# Patient Record
Sex: FEMALE | Race: WHITE | NOT HISPANIC OR LATINO | Employment: OTHER | URBAN - METROPOLITAN AREA
[De-identification: names, ages, dates, MRNs, and addresses within clinical notes are randomized per-mention and may not be internally consistent; named-entity substitution may affect disease eponyms.]

---

## 2018-08-08 ENCOUNTER — TRANSFERRED RECORDS (OUTPATIENT)
Dept: HEALTH INFORMATION MANAGEMENT | Facility: CLINIC | Age: 83
End: 2018-08-08
Payer: MEDICARE

## 2018-10-24 LAB
CHOLESTEROL (EXTERNAL): 206 MG/DL (ref 70–199)
HDLC SERPL-MCNC: 60 MG/DL (ref 40–90)
LDL CHOLESTEROL CALCULATED (EXTERNAL): 130 MG/DL (ref 50–129)
NON HDL CHOLESTEROL (EXTERNAL): 146 MG/DL (ref 70–159)
TRIGLYCERIDES (EXTERNAL): 78 MG/DL (ref 30–149)

## 2019-03-05 ENCOUNTER — TRANSFERRED RECORDS (OUTPATIENT)
Dept: HEALTH INFORMATION MANAGEMENT | Facility: CLINIC | Age: 84
End: 2019-03-05
Payer: MEDICARE

## 2019-03-26 ENCOUNTER — TRANSFERRED RECORDS (OUTPATIENT)
Dept: HEALTH INFORMATION MANAGEMENT | Facility: CLINIC | Age: 84
End: 2019-03-26
Payer: MEDICARE

## 2019-08-17 ENCOUNTER — TRANSFERRED RECORDS (OUTPATIENT)
Dept: HEALTH INFORMATION MANAGEMENT | Facility: CLINIC | Age: 84
End: 2019-08-17
Payer: MEDICARE

## 2020-01-07 ENCOUNTER — TRANSFERRED RECORDS (OUTPATIENT)
Dept: HEALTH INFORMATION MANAGEMENT | Facility: CLINIC | Age: 85
End: 2020-01-07
Payer: MEDICARE

## 2020-03-02 ENCOUNTER — TRANSFERRED RECORDS (OUTPATIENT)
Dept: HEALTH INFORMATION MANAGEMENT | Facility: CLINIC | Age: 85
End: 2020-03-02
Payer: MEDICARE

## 2020-08-13 LAB
CHOLESTEROL (EXTERNAL): 158 MG/DL (ref 70–199)
HDLC SERPL-MCNC: 56 MG/DL (ref 40–90)
LDL CHOLESTEROL CALCULATED (EXTERNAL): 87 MG/DL (ref 50–129)
NON HDL CHOLESTEROL (EXTERNAL): 102 MG/DL (ref 70–159)
TRIGLYCERIDES (EXTERNAL): 77 MG/DL (ref 0–149)

## 2020-10-16 ENCOUNTER — TRANSFERRED RECORDS (OUTPATIENT)
Dept: HEALTH INFORMATION MANAGEMENT | Facility: CLINIC | Age: 85
End: 2020-10-16
Payer: MEDICARE

## 2020-11-09 ENCOUNTER — TRANSFERRED RECORDS (OUTPATIENT)
Dept: HEALTH INFORMATION MANAGEMENT | Facility: CLINIC | Age: 85
End: 2020-11-09
Payer: MEDICARE

## 2020-11-21 ENCOUNTER — TRANSFERRED RECORDS (OUTPATIENT)
Dept: HEALTH INFORMATION MANAGEMENT | Facility: CLINIC | Age: 85
End: 2020-11-21
Payer: MEDICARE

## 2020-12-09 ENCOUNTER — TRANSFERRED RECORDS (OUTPATIENT)
Dept: HEALTH INFORMATION MANAGEMENT | Facility: CLINIC | Age: 85
End: 2020-12-09
Payer: MEDICARE

## 2020-12-10 LAB
ALT SERPL-CCNC: 12 IU/L (ref 0–30)
AST SERPL-CCNC: 16 IU/L (ref 0–25)
CREATININE (EXTERNAL): 1.03 MG/DL (ref 0.6–1.2)
GFR ESTIMATED (EXTERNAL): 51 ML/MIN/1.73M2 (ref 60–130)
GFR ESTIMATED (IF AFRICAN AMERICAN) (EXTERNAL): >60 ML/MIN/1.73M2 (ref 60–130)
GLUCOSE (EXTERNAL): 126 MG/DL (ref 74–200)
POTASSIUM (EXTERNAL): 5.1 MMOL/L (ref 3.5–5.5)

## 2020-12-22 ENCOUNTER — TRANSFERRED RECORDS (OUTPATIENT)
Dept: HEALTH INFORMATION MANAGEMENT | Facility: CLINIC | Age: 85
End: 2020-12-22
Payer: MEDICARE

## 2020-12-30 ENCOUNTER — TRANSFERRED RECORDS (OUTPATIENT)
Dept: HEALTH INFORMATION MANAGEMENT | Facility: CLINIC | Age: 85
End: 2020-12-30
Payer: MEDICARE

## 2021-01-04 ENCOUNTER — TRANSFERRED RECORDS (OUTPATIENT)
Dept: HEALTH INFORMATION MANAGEMENT | Facility: CLINIC | Age: 86
End: 2021-01-04

## 2021-01-21 ENCOUNTER — TRANSFERRED RECORDS (OUTPATIENT)
Dept: HEALTH INFORMATION MANAGEMENT | Facility: CLINIC | Age: 86
End: 2021-01-21
Payer: MEDICARE

## 2021-02-11 ENCOUNTER — TRANSFERRED RECORDS (OUTPATIENT)
Dept: HEALTH INFORMATION MANAGEMENT | Facility: CLINIC | Age: 86
End: 2021-02-11
Payer: MEDICARE

## 2021-02-16 ENCOUNTER — TRANSFERRED RECORDS (OUTPATIENT)
Dept: HEALTH INFORMATION MANAGEMENT | Facility: CLINIC | Age: 86
End: 2021-02-16
Payer: MEDICARE

## 2021-02-22 ENCOUNTER — TRANSFERRED RECORDS (OUTPATIENT)
Dept: HEALTH INFORMATION MANAGEMENT | Facility: CLINIC | Age: 86
End: 2021-02-22
Payer: MEDICARE

## 2021-03-08 ENCOUNTER — TRANSFERRED RECORDS (OUTPATIENT)
Dept: HEALTH INFORMATION MANAGEMENT | Facility: CLINIC | Age: 86
End: 2021-03-08
Payer: MEDICARE

## 2021-03-15 ENCOUNTER — TRANSFERRED RECORDS (OUTPATIENT)
Dept: HEALTH INFORMATION MANAGEMENT | Facility: CLINIC | Age: 86
End: 2021-03-15
Payer: MEDICARE

## 2021-04-01 ENCOUNTER — TRANSFERRED RECORDS (OUTPATIENT)
Dept: HEALTH INFORMATION MANAGEMENT | Facility: CLINIC | Age: 86
End: 2021-04-01
Payer: MEDICARE

## 2021-04-05 ENCOUNTER — TRANSFERRED RECORDS (OUTPATIENT)
Dept: HEALTH INFORMATION MANAGEMENT | Facility: CLINIC | Age: 86
End: 2021-04-05
Payer: MEDICARE

## 2021-04-09 ENCOUNTER — TRANSFERRED RECORDS (OUTPATIENT)
Dept: HEALTH INFORMATION MANAGEMENT | Facility: CLINIC | Age: 86
End: 2021-04-09
Payer: MEDICARE

## 2021-04-10 ENCOUNTER — TRANSFERRED RECORDS (OUTPATIENT)
Dept: HEALTH INFORMATION MANAGEMENT | Facility: CLINIC | Age: 86
End: 2021-04-10
Payer: MEDICARE

## 2021-04-12 ENCOUNTER — TRANSFERRED RECORDS (OUTPATIENT)
Dept: HEALTH INFORMATION MANAGEMENT | Facility: CLINIC | Age: 86
End: 2021-04-12

## 2021-04-12 LAB
ALT SERPL-CCNC: 14 U/L (ref 8–37)
AST SERPL-CCNC: 15 U/L
CREATININE (EXTERNAL): 0.84 MG/DL (ref 0.5–1.1)
GFR ESTIMATED (EXTERNAL): 62 ML/MIN/1.73M2
GLUCOSE (EXTERNAL): 66 MG/DL (ref 60–99)
POTASSIUM (EXTERNAL): 3.8 MMOL/L (ref 3.5–5.2)

## 2021-04-13 LAB
ALT SERPL-CCNC: 14 U/L (ref 8–37)
AST SERPL-CCNC: 13 U/L
CREATININE (EXTERNAL): 0.8 MG/DL (ref 0.5–1.1)
GFR ESTIMATED (EXTERNAL): 66 ML/MIN/1.73M2
GLUCOSE (EXTERNAL): 78 MG/DL (ref 60–99)
POTASSIUM (EXTERNAL): 3.5 MMOL/L (ref 3.5–5.2)
TSH SERPL-ACNC: 2.5 UIU/ML (ref 0.4–4.5)

## 2021-04-15 ENCOUNTER — TRANSFERRED RECORDS (OUTPATIENT)
Dept: HEALTH INFORMATION MANAGEMENT | Facility: CLINIC | Age: 86
End: 2021-04-15
Payer: MEDICARE

## 2021-04-16 ENCOUNTER — TRANSFERRED RECORDS (OUTPATIENT)
Dept: HEALTH INFORMATION MANAGEMENT | Facility: CLINIC | Age: 86
End: 2021-04-16
Payer: MEDICARE

## 2021-05-01 ENCOUNTER — TRANSFERRED RECORDS (OUTPATIENT)
Dept: HEALTH INFORMATION MANAGEMENT | Facility: CLINIC | Age: 86
End: 2021-05-01
Payer: MEDICARE

## 2021-05-01 LAB
ALT SERPL-CCNC: 14 U/L (ref 8–37)
ALT SERPL-CCNC: 14 U/L (ref 8–37)
AST SERPL-CCNC: 22 U/L
AST SERPL-CCNC: 22 U/L
CREATININE (EXTERNAL): 1.18 MG/DL (ref 0.5–1.1)
GFR ESTIMATED (EXTERNAL): 41 ML/MIN/1.73M2
GLUCOSE (EXTERNAL): 95 MG/DL (ref 60–99)
INR (EXTERNAL): 1.1
POTASSIUM (EXTERNAL): 4 MMOL/L (ref 3.5–5.2)
TSH SERPL-ACNC: 1.17 UIU/ML (ref 0.4–4.5)

## 2021-05-02 ENCOUNTER — TRANSFERRED RECORDS (OUTPATIENT)
Dept: HEALTH INFORMATION MANAGEMENT | Facility: CLINIC | Age: 86
End: 2021-05-02
Payer: MEDICARE

## 2021-05-02 LAB
CHOLESTEROL (EXTERNAL): 147 MG/DL
CREATININE (EXTERNAL): 1 MG/DL (ref 0.5–1.1)
EJECTION FRACTION: 55 %
GFR ESTIMATED (EXTERNAL): 50 ML/MIN/1.73M2
GLUCOSE (EXTERNAL): 104 MG/DL (ref 60–99)
HBA1C MFR BLD: 5.3 % (ref 4–5.6)
HDLC SERPL-MCNC: 36 MG/DL
LDL CHOLESTEROL CALCULATED (EXTERNAL): 91 MG/DL
NON HDL CHOLESTEROL (EXTERNAL): 111 MG/DL
POTASSIUM (EXTERNAL): 4.3 MMOL/L (ref 3.5–5.2)
TRIGLYCERIDES (EXTERNAL): 100 MG/DL

## 2021-05-03 ENCOUNTER — TRANSFERRED RECORDS (OUTPATIENT)
Dept: HEALTH INFORMATION MANAGEMENT | Facility: CLINIC | Age: 86
End: 2021-05-03
Payer: MEDICARE

## 2021-05-07 ENCOUNTER — TRANSFERRED RECORDS (OUTPATIENT)
Dept: HEALTH INFORMATION MANAGEMENT | Facility: CLINIC | Age: 86
End: 2021-05-07
Payer: MEDICARE

## 2021-05-10 ENCOUNTER — TRANSFERRED RECORDS (OUTPATIENT)
Dept: HEALTH INFORMATION MANAGEMENT | Facility: CLINIC | Age: 86
End: 2021-05-10

## 2021-11-16 ENCOUNTER — TRANSFERRED RECORDS (OUTPATIENT)
Dept: HEALTH INFORMATION MANAGEMENT | Facility: CLINIC | Age: 86
End: 2021-11-16
Payer: MEDICARE

## 2021-12-03 ENCOUNTER — TRANSFERRED RECORDS (OUTPATIENT)
Dept: HEALTH INFORMATION MANAGEMENT | Facility: CLINIC | Age: 86
End: 2021-12-03
Payer: MEDICARE

## 2021-12-06 LAB
CHOLESTEROL (EXTERNAL): 206 MG/DL
CREATININE (EXTERNAL): 1.02 MG/DL (ref 0.5–1.1)
GFR ESTIMATED (EXTERNAL): 49 ML/MIN/1.73M2
GLUCOSE (EXTERNAL): 90 MG/DL (ref 60–99)
HDLC SERPL-MCNC: 54 MG/DL
LDL CHOLESTEROL (EXTERNAL): 130 MG/DL
NON HDL CHOLESTEROL (EXTERNAL): 152 MG/DL
POTASSIUM (EXTERNAL): 4.7 MMOL/L (ref 3.5–5.2)
TRIGLYCERIDES (EXTERNAL): 111 MG/DL

## 2021-12-18 ENCOUNTER — TRANSFERRED RECORDS (OUTPATIENT)
Dept: HEALTH INFORMATION MANAGEMENT | Facility: CLINIC | Age: 86
End: 2021-12-18

## 2021-12-21 ENCOUNTER — TRANSFERRED RECORDS (OUTPATIENT)
Dept: HEALTH INFORMATION MANAGEMENT | Facility: CLINIC | Age: 86
End: 2021-12-21
Payer: MEDICARE

## 2022-01-07 ENCOUNTER — TRANSFERRED RECORDS (OUTPATIENT)
Dept: HEALTH INFORMATION MANAGEMENT | Facility: CLINIC | Age: 87
End: 2022-01-07
Payer: MEDICARE

## 2022-01-10 ENCOUNTER — TRANSFERRED RECORDS (OUTPATIENT)
Dept: HEALTH INFORMATION MANAGEMENT | Facility: CLINIC | Age: 87
End: 2022-01-10
Payer: MEDICARE

## 2022-02-01 ENCOUNTER — MEDICAL CORRESPONDENCE (OUTPATIENT)
Dept: HEALTH INFORMATION MANAGEMENT | Facility: CLINIC | Age: 87
End: 2022-02-01
Payer: MEDICARE

## 2022-02-01 ENCOUNTER — TRANSFERRED RECORDS (OUTPATIENT)
Dept: HEALTH INFORMATION MANAGEMENT | Facility: CLINIC | Age: 87
End: 2022-02-01
Payer: MEDICARE

## 2022-02-16 ENCOUNTER — TRANSCRIBE ORDERS (OUTPATIENT)
Dept: OTHER | Age: 87
End: 2022-02-16
Payer: MEDICARE

## 2022-02-16 DIAGNOSIS — C67.9 BLADDER CANCER (H): Primary | ICD-10-CM

## 2022-02-17 ENCOUNTER — PRE VISIT (OUTPATIENT)
Dept: UROLOGY | Facility: CLINIC | Age: 87
End: 2022-02-17
Payer: MEDICARE

## 2022-02-17 ENCOUNTER — TELEPHONE (OUTPATIENT)
Dept: UROLOGY | Facility: CLINIC | Age: 87
End: 2022-02-17
Payer: MEDICARE

## 2022-02-17 NOTE — TELEPHONE ENCOUNTER
[facility] Call Center    Phone Message    May a detailed message be left on voicemail: yes     Reason for Call: Appointment Intake    Referring Provider Name: SCOOTER DE LA TORRE  Diagnosis and/or Symptoms: Bladder Cancer    Patient being referred for Bladder Cancer and requesting Iram Coronado per referral note. There are also notes that state patient doesn't want aggressive treatment - wants evaluation for urine symptoms.  unsure the right provider to schedule with, since this is two different areas of the clinic specialties, so sending encounter message for clinic review and follow-up with patient for scheduling.     Action Taken: Message routed to:  [facility] Urology    Travel Screening: Not Applicable

## 2022-03-10 NOTE — TELEPHONE ENCOUNTER
Action 2022 JTV 9:46pm   Action Taken CSS received records from [facility]. Records sent to scanning for processing.      Action 2022 JTV 10:01am   Action Taken CSS sent a request to [facility] [place] for records. Batool from [employer] states that she will need a shipping label because [employer] is not able to push images. Batool's fax: [phone].    CSS faxed a shipping label to Bat[employer] at [employer].   Tracking number: [id number]     Action 2022 JTV 3:52pm   Action Taken CSS received CDs from [employer]. CD sent to Kaye to process. Reports were faxed to scanning.            MEDICAL RECORDS REQUEST   [facility]  Urology Clinic  [address] [place] [address]  PHONE: [phone]  Fax: [phone]        FUTURE VISIT INFORMATION                                                   Christiana Salazar YOB: 1932 scheduled for future visit at [facility]    APPOINTMENT INFORMATION:    Date: 2022    Provider:  Trinity Sullivan MD    Reason for Visit/Diagnosis: new bladder cancer - wants evaluation for urine symptoms. doesn't want aggressive treatment.    REFERRAL INFORMATION:    Referring provider:  Kim Ayers MD    Referring providers clinic:  [facility]     Clinic contact number:  Ph. [phone], Fax [phone]    RECORDS REQUESTED FOR VISIT                                                     NOTES  STATUS/DETAILS   OFFICE NOTE from referring provider  yes, 2022 -- Kim Ayers MD -- [facility]   OFFICE NOTE from other specialist  Yes, 2022 -- Mery Vizcarra DO -- [facility]    2021 -- Deborah Vazquez PA-C -- [facility]     2021 -- Stromberg, Jannifer S, MD -- [facility]    2021 -- Jennifer Greer DO     2020 -- Logan Lindsey MD -- [facility]uk[facility] [place]       more in EPIC   DISCHARGE SUMMARY  from hospital  yes, 04/10/2021 -- Sam Edward MD -- [place], [place]   OP VISIT  yes, 01/10/2022 -- [facility]   MEDICATION LIST  yes, Care Everywhere   LABS     URINALYSIS (UA)  yes, 04/10/2021   BLADDER CANCER     PATHOLOGY REPORT  yes,2021 -- Case #: [id number] [facility]   IMAGES Received yes, 01/10/2022, 2020 --CT UROGRAM -- [facility]   04/10/2021, 2019 -- CT ABD/P -- [facility]     PRE-VISIT CHECKLIST      Record collection complete If no, please explain pending   Appointment appropriately scheduled           (right time/right provider) Yes   Joint diagnostic appointment coordinated correctly          (ensure right order & amount of time) Yes   MyChart activation If no, please explain pending   Questionnaire complete If no, please explain pending     Action 03/10/2022 JTV 1:10pm   Action Taken CSS called patient. Patient is not available. The phone number listed belongs to the patient's daughter Yuniel Bush states that she had dropped off records at the [address] [facility] sent a message to Nurse Thacker at [facility]. [facility] confirmed with Rachelle that her email is: [email or web address]. [facility] will send Rachelle a NICOL to sign and date.        Action 03/15/2022 JTV 9:15am   Action Taken CSS received records from Nurse Walden from [facility].   [facility] sent request to [facility] and [facility] for pathology slides. [facility] sent request for images from [facility], [facility], and [facility].      [facility] waiting for signed NICOL from patient's daughter for [facility] and [facility]. [facility] sent another email to patient's daughter : [email or web address] with blank ROIs.     Action 2022 JTV 3:43pm   Action Taken CSS sent 2 blank ROIs, [facility] and , to the patient and her daughter.  Tracking number for shipment is: [id number]. [facility] included a return label, Trackin. Patient's daughter states  she will fill out the NICOL forms and get them back before the patient's appointment.      Action 03/28/2022 JTV   Action Taken CSS received patient NICOL back with patient's daughter's signature. CSS sent signed returned NICOL to scanning for processing.

## 2022-03-11 ENCOUNTER — PATIENT OUTREACH (OUTPATIENT)
Dept: ONCOLOGY | Facility: CLINIC | Age: 87
End: 2022-03-11
Payer: MEDICARE

## 2022-03-11 NOTE — PROGRESS NOTES
Patient's daughter dropped records off at the [facility] office. Records faxed per following request   Harjit Walden,   Can you fax them to me at [phone] so I can review then records and try to get images requested. Please reach out to me at [phone] if you have any questions.     Thanks   Elizabeth Valencia RN on 3/11/2022 at 4:24 PM

## 2022-04-14 ENCOUNTER — ONCOLOGY VISIT (OUTPATIENT)
Dept: ONCOLOGY | Facility: CLINIC | Age: 87
End: 2022-04-14
Attending: INTERNAL MEDICINE
Payer: MEDICARE

## 2022-04-14 VITALS
OXYGEN SATURATION: 94 % | DIASTOLIC BLOOD PRESSURE: 53 MMHG | SYSTOLIC BLOOD PRESSURE: 104 MMHG | WEIGHT: 185.8 LBS | RESPIRATION RATE: 16 BRPM | HEIGHT: 63 IN | HEART RATE: 86 BPM | BODY MASS INDEX: 32.92 KG/M2 | TEMPERATURE: 97 F

## 2022-04-14 DIAGNOSIS — N39.41 URGE INCONTINENCE OF URINE: Primary | ICD-10-CM

## 2022-04-14 PROCEDURE — 88112 CYTOPATH CELL ENHANCE TECH: CPT | Mod: TC | Performed by: UROLOGY

## 2022-04-14 PROCEDURE — 88120 CYTP URNE 3-5 PROBES EA SPEC: CPT | Mod: TC | Performed by: UROLOGY

## 2022-04-14 PROCEDURE — 52000 CYSTOURETHROSCOPY: CPT | Performed by: UROLOGY

## 2022-04-14 PROCEDURE — 250N000013 HC RX MED GY IP 250 OP 250 PS 637: Performed by: UROLOGY

## 2022-04-14 PROCEDURE — 52000 CYSTOURETHROSCOPY: CPT

## 2022-04-14 PROCEDURE — 99204 OFFICE O/P NEW MOD 45 MIN: CPT | Mod: 25 | Performed by: UROLOGY

## 2022-04-14 RX ORDER — FUROSEMIDE 40 MG
20 TABLET ORAL EVERY OTHER DAY
COMMUNITY

## 2022-04-14 RX ORDER — TRAZODONE HYDROCHLORIDE 50 MG/1
TABLET, FILM COATED ORAL
COMMUNITY
Start: 2022-01-20

## 2022-04-14 RX ORDER — DISINFECTANT WIPES 0.21 1/701
1 CLOTH EXTRACORPOREAL ONCE
Status: DISCONTINUED | OUTPATIENT
Start: 2022-04-14 | End: 2022-04-14

## 2022-04-14 RX ORDER — ALBUTEROL SULFATE 0.83 MG/ML
2.5 SOLUTION RESPIRATORY (INHALATION)
COMMUNITY

## 2022-04-14 RX ORDER — LISINOPRIL 20 MG/1
TABLET ORAL
COMMUNITY
Start: 2022-01-20

## 2022-04-14 RX ORDER — OXYBUTYNIN CHLORIDE 10 MG/1
10 TABLET, EXTENDED RELEASE ORAL 2 TIMES DAILY
COMMUNITY

## 2022-04-14 RX ORDER — DIPHENOXYLATE HYDROCHLORIDE AND ATROPINE SULFATE 2.5; .025 MG/1; MG/1
1 TABLET ORAL DAILY
COMMUNITY

## 2022-04-14 RX ORDER — CIPROFLOXACIN 500 MG/1
500 TABLET, FILM COATED ORAL ONCE
Status: COMPLETED | OUTPATIENT
Start: 2022-04-14 | End: 2022-04-14

## 2022-04-14 RX ORDER — OXYBUTYNIN CHLORIDE 5 MG/1
5 TABLET, EXTENDED RELEASE ORAL DAILY
Qty: 30 TABLET | Refills: 0 | Status: SHIPPED | OUTPATIENT
Start: 2022-04-14 | End: 2022-05-16

## 2022-04-14 RX ORDER — FLUTICASONE PROPIONATE 50 MCG
1 SPRAY, SUSPENSION (ML) NASAL
COMMUNITY
Start: 2022-01-20

## 2022-04-14 RX ORDER — SIMVASTATIN 40 MG
TABLET ORAL AT BEDTIME
COMMUNITY
Start: 2022-01-20

## 2022-04-14 RX ORDER — HYDROCODONE BITARTRATE AND ACETAMINOPHEN 5; 325 MG/1; MG/1
TABLET ORAL
COMMUNITY
Start: 2022-03-02 | End: 2022-08-18

## 2022-04-14 RX ADMIN — CIPROFLOXACIN 500 MG: 500 TABLET, FILM COATED ORAL at 11:46

## 2022-04-14 ASSESSMENT — PAIN SCALES - GENERAL: PAINLEVEL: NO PAIN (0)

## 2022-04-14 NOTE — PROGRESS NOTES
Nursing Note:  Christiana Salazar presents today for Cystoscopy.  Patient seen by provider today: Yes: Dr. Sullivan.   present during visit today: Not Applicable.    Note: N/A.    Labs:  Urine cytology     Procedure:  Urology Procedure: Cystoscopy.    Verified:  Time out included verbal and active participation of all team members: Yes.      Post Procedure:  Patient tolerated the procedure without incident..    Post Pain Assessment: 0 out of 10.    Discharge Plan:   Departure Mode: Ambulatory.    Lucille Nogueira, CMA

## 2022-04-14 NOTE — LETTER
"    4/14/2022         RE: Christiana Salazar  [address] [place] [address]        Dear Colleague,    Thank you for referring your patient, Christiana Salazar, to the [facility]. Please see a copy of my visit note below.    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 Donovan at [place]. She received induction BCG x 6 per her daughter who accompanies her today with complete response but no maintenance treatment was offered. Surveillance cystoscopy in November of 2021 showed evidence of recurrence for which she underwent TURBT on 12/17/2021 by Dr. Orlin Humphrey:     \"TURBT checklist  1) Number of tumors: 1  2) Size of largest tumor: 2 cm  3) Characteristic of tumor: papillary with some sloughing debris, superficial  4) Recurrent vs. 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 She recently moved to [place] to be closer to her daughter and is in the process of transitioning all her medical care. She met with Dr. Ayers at [facility] in February 2022 who referred her here to [facility] for further discussion regarding surgical options for her disease but no further details about their conversation is reflected in the note. Per daughter, Dr. Ayers would wanted to get my opinion about the surgery first before " formulating any plans. She had a restaging CT urogram done on 1/4/2022 with no clear evidence of metastatic disease.     She is in good shape for her age and if the weather permits, she likes to spend time in the garden working on her flowers and be as active as possible in the house. She can walk 2 blocks before getting short of breath and tired. ECOG 1-2              Past Medical History:     Arthritis     Chronic back pain     Edema of lower extremity     GERD (gastroesophageal reflux disease)     High blood pressure     High cholesterol     History of IBS     Malignant neoplasm of overlapping sites of bladder 11/21/2020     Spine degeneration              Past Surgical History:     APPENDECTOMY     ARTHROPLASTY, TOTAL KNEE, LEFT     ARTHROPLASTY, TOTAL KNEE, RIGHT     CHOLECYSTECTOMY     EXTRACTION, CATARACT EXTRACAPSULAR WITH INTRAOCULAR LENS Bilateral     HYSTERECTOMY 1972   FULL     INJECTION THERAPUTIC CARPEL TUNNEL 2011,2015   Rt hand, Lt hand     LAMINECTOMY, LUMBAR     OHS TRANSURETHRAL RESECTION OF BLADDER TUMOR 11/21/2020     TONSILLECTOMY AND ADENOIDECTOMY              Medications     Current Outpatient Medications   Medication     albuterol (PROVENTIL) (2.5 MG/3ML) 0.083% neb solution     Docusate Sodium (COLACE PO)     fluticasone (FLONASE) 50 MCG/ACT nasal spray     furosemide (LASIX) 40 MG tablet     HYDROcodone-acetaminophen (NORCO) 5-325 MG tablet     lisinopril (ZESTRIL) 20 MG tablet     Multiple Vitamin (MULTI-VITAMINS) TABS     oxybutynin ER (DITROPAN-XL) 10 MG 24 hr tablet     potassium aminobenzoate 500 MG CAPS capsule     simvastatin (ZOCOR) 40 MG tablet     traZODone (DESYREL) 50 MG tablet     Current Facility-Administered Medications   Medication     ciprofloxacin (CIPRO) tablet 500 mg            Family History:   History reviewed. No pertinent family history.         Social History:     Social History     Socioeconomic History     Marital status:      Spouse name: Not on file      "Number of children: Not on file     Years of education: Not on file     Highest education level: Not on file   Occupational History     Not on file   Tobacco Use     Smoking status: Never Smoker     Smokeless tobacco: Never Used   Substance and Sexual Activity     Alcohol use: Not Currently     Drug use: Not on file     Sexual activity: Not on file   Other Topics Concern     Not on file   Social History Narrative     Not on file     Social Determinants of Health     Financial Resource Strain: Not on file   Food Insecurity: Not on file   Transportation Needs: Not on file   Physical Activity: Not on file   Stress: Not on file   Social Connections: Not on file   Intimate Partner Violence: Not on file   Housing Stability: Not on file            Allergies:   Contrast dye, Diagnostic x-ray materials, Naproxen, Iodine, and Seasonal allergies         Review of Systems:  From intake questionnaire     Negative 14 system review except as noted on HPI, nurse's note.         Physical Exam:     Patient is a 89 year old  female    Vitals: Blood pressure 104/53, pulse 86, temperature 97  F (36.1  C), temperature source Tympanic, resp. rate 16, height 1.588 m (5' 2.5\"), weight 84.3 kg (185 lb 12.8 oz), SpO2 94 %. Body mass index is 33.44 kg/m .  General Appearance Adult: Alert, no acute distress, oriented for the most part, confused at times   HENT: throat/mouth:normal, good dentition  Lungs: no respiratory distress, or pursed lip breathing  Heart: No obvious jugular venous distension present  Abdomen: soft, nontender, no organomegaly or masses, scars noted  Lymphatics: No cervical or supraclavicular adenopathy  Musculoskeltal: extremities normal, no peripheral edema  Skin: no visible suspicious lesions or rashes  Neuro: Alert, oriented, speech and mentation normal  Psych: affect and mood normal  Gait: Normal  : deferred to cystoscopy        Labs and Pathology:    I reviewed all applicable laboratory and pathology data and went " over findings with patient   Ref Range & Units 4 mo ago   Sodium 135 - 145 mmol/L 138    Potassium 3.5 - 5.2 mmol/L 4.7    Chloride 98 - 111 mmol/L 105    Carbon Dioxide (CO2) 20 - 29 mmol/L 25    Anion Gap 5 - 17 8    Glucose 60 - 99 mg/dL 90    Comment: Fasting Blood Glucose:  60-99 mg/dL   Random Blood Glucose:    mg/dL   Blood Urea Nitrogen (BUN) 7 - 25 mg/dL 23    Creatinine 0.50 - 1.10 mg/dL 1.02    GFR Non African American >=60 mL/min/1.73m2 49 Low     Comment: Glomerular Filtration Rate is estimated from serum creatinine, age, gender and race using the CKD-EPI equation:          G1:  Normal GFR: >=90        G2:  Mildly decreased GFR: 60-89        G3a: Mildly to moderately decreased GFR: 45-59        G3b: Moderately to severely decreased GFR: 30-44        G4:  Severely decreased GFR: 15-29        G5:  Kidney failure GFR: <15   Calcium 8.5 - 10.5 mg/dL 10.0    Resulting [facility]             (CLIA#: [id number])   Specimen Collected: 21  2:59 PM Last Resulted: 21  4:23 PM      Ref Range & Units 4 mo ago   WBC 3.3 - 10.7 devon/L 6.6    RBC 3.87 - 5.08 tril/L 4.08    Hemoglobin 12.1 - 15.0 g/dL 12.2    Hematocrit 35.4 - 44.2 % 38.0    MCV 80 - 100 fL 93    MCH 28 - 33 pg 30    MCHC 32 - 35 g/dL 32    RDW-CV 12 - 15 % 13    Platelets 150 - 400 devon/L 316    Nucleated Red Blood Cells <=0.0 % 0.0    Resulting [facility]             (CLIA#: [id number])   Specimen Collected: 21  2:59 PM Last Resulted: 21  3:57 PM     Surgical pathology                        Surgical Pathology Report     Patient Name: GERARDO HEATH                    Accession #: [id number]   Med: Rec.: [id number]                               Phone Number: [phone]   : 1932 (Age: 89)                          Gender: F   Client: [facility] - [facility]              [facility]: [facility]                                                     Taken: 2021                                                      Received: 12/20/2021                                                     Reported: 12/23/2021   Physician(s): GUERA BASS     ========================================================================   CLINICAL HISTORY   Malignant neoplasm of overlapping sites of bladder     OPERATIVE DIAGNOSES       Operation/Specimen: A: Urinary bladder, TURBT left anterior neck TUMOR       PATHOLOGICAL DIAGNOSIS:   A.  Urinary bladder, TURBT left anterior neck TUMOR:    . 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 See   comment    . Tumor invades into muscularis propria.     COMMENT   Sections show poorly differentiated carcinoma with basaloid features. No   definitive malignant surface component is present. Differential diagnosis   includes; urothelial carcinoma with basaloid features and basaloid squamous   cell carcinoma. It is hard to distinguish between these two differential   diagnosis based on morphologic and immunohistochemical features. Although we   favor that, this represent a bladder primary tumor, the possibility of   secondary bladder involvement by tumors from other organs; mainly gynecologic   tract, should be clinically ruled out.           Imaging:    I reviewed all applicable imaging and went over findings with patient.              Outside and Past Medical records    I spent 30 minutes reviewing outside and past medical records including the notes from [place]     Cystoscopy procedure     After sterile preparation and draping of the patient,  a 16-Finnish flexible cystoscope was introduced via the urethra.  It was passed without difficulty into the bladder.  The urethra was open without evidence of stricture.  The ureteral orifices were orthotopic.  The prior resection site at the bladder neck appeared intact and the bladder mucosa was evaluated using both antegrade and retroflex views and this showed no evidence of any other lesions  or trabeculation.  60 cc of urine was then collected and sent off for cytology.  Scope was then removed and patient tolerated the procedure relatively well.     10 total minutes was spent on cystoscopy procedure alone.             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 We discussed the perioperative course of cystectomy and given her age and functional/cognitive status, I would anticipate a longer term recovery with potential need for prolonged rehabilitation. One of her main reasons for moving to [place] was to be close to her daughter and her family and to spend some quality time with them but I am afraid that recovery from surgery may negatively impact that goal and she also expresses concerns for dealing with prolonged recovery and potentially worsened cognitive function as a result. We also discussed the role of chemoradiation which would spare her the morbidities of surgery and would result in same oncological outcome.     She and her daughter were both agreeable and would like to consider trimodal treatment at this point. She would also like to transfer her care to [facility] and establish her bladder cancer care with one of our medical oncologists.      Plan  Refer to radiation and medical oncology for consideration of chemoradiation     60 total minutes spent on the date of the encounter including direct interaction with the patient, performing chart review, documentation and further activities as noted above, exclusive of the time spent on performing cystoscopy.         Trinity Sullivan MD   Department of Urology   [facility]       Nursing Note:  Christiana Salazar presents today for Cystoscopy.  Patient seen by  provider today: Yes: Dr. Sullivan.   present during visit today: Not Applicable.    Note: N/A.    Labs:  Urine cytology     Procedure:  Urology Procedure: Cystoscopy.    Verified:  Time out included verbal and active participation of all team members: Yes.      Post Procedure:  Patient tolerated the procedure without incident..    Post Pain Assessment: 0 out of 10.    Discharge Plan:   Departure Mode: Ambulatory.    Lucille Nogueira CMA          Again, thank you for allowing me to participate in the care of your patient.        Sincerely,        Trinity Sullivan MD

## 2022-04-14 NOTE — PROGRESS NOTES
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 Donovan at [place]. She received induction BCG x 6 per her daughter who accompanies her today with complete response but no maintenance treatment was offered. Surveillance cystoscopy in November of 2021 showed evidence of recurrence for which she underwent TURBT on 12/17/2021 by Dr. Orlin Humphrey:     \"TURBT checklist  1) Number of tumors: 1  2) Size of largest tumor: 2 cm  3) Characteristic of tumor: papillary with some sloughing debris, superficial  4) Recurrent vs. 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 She recently moved to [place] to be closer to her daughter and is in the process of transitioning all her medical care. She met with Dr. Ayers at [facility] in February 2022 who referred her [facility] for further discussion regarding surgical options for her disease but no further details about their conversation is reflected in the note. Per daughter, Dr. Ayers would wanted to get my opinion about the surgery first before formulating any plans. She had a restaging CT urogram done on 1/4/2022 with no clear evidence of metastatic disease.     She is in good shape for her age and if the weather permits, she likes to spend time in the garden working on her flowers and be as active as " possible in the house. She can walk 2 blocks before getting short of breath and tired. ECOG 1-2              Past Medical History:     Arthritis     Chronic back pain     Edema of lower extremity     GERD (gastroesophageal reflux disease)     High blood pressure     High cholesterol     History of IBS     Malignant neoplasm of overlapping sites of bladder 11/21/2020     Spine degeneration              Past Surgical History:     APPENDECTOMY     ARTHROPLASTY, TOTAL KNEE, LEFT     ARTHROPLASTY, TOTAL KNEE, RIGHT     CHOLECYSTECTOMY     EXTRACTION, CATARACT EXTRACAPSULAR WITH INTRAOCULAR LENS Bilateral     HYSTERECTOMY 1972   FULL     INJECTION THERAPUTIC CARPEL TUNNEL 2011,2015   Rt hand, Lt hand     LAMINECTOMY, LUMBAR     OHS TRANSURETHRAL RESECTION OF BLADDER TUMOR 11/21/2020     TONSILLECTOMY AND ADENOIDECTOMY              Medications     Current Outpatient Medications   Medication     albuterol (PROVENTIL) (2.5 MG/3ML) 0.083% neb solution     Docusate Sodium (COLACE PO)     fluticasone (FLONASE) 50 MCG/ACT nasal spray     furosemide (LASIX) 40 MG tablet     HYDROcodone-acetaminophen (NORCO) 5-325 MG tablet     lisinopril (ZESTRIL) 20 MG tablet     Multiple Vitamin (MULTI-VITAMINS) TABS     oxybutynin ER (DITROPAN-XL) 10 MG 24 hr tablet     potassium aminobenzoate 500 MG CAPS capsule     simvastatin (ZOCOR) 40 MG tablet     traZODone (DESYREL) 50 MG tablet     Current Facility-Administered Medications   Medication     ciprofloxacin (CIPRO) tablet 500 mg            Family History:   History reviewed. No pertinent family history.         Social History:     Social History     Socioeconomic History     Marital status:      Spouse name: Not on file     Number of children: Not on file     Years of education: Not on file     Highest education level: Not on file   Occupational History     Not on file   Tobacco Use     Smoking status: Never Smoker     Smokeless tobacco: Never Used   Substance and Sexual Activity  "    Alcohol use: Not Currently     Drug use: Not on file     Sexual activity: Not on file   Other Topics Concern     Not on file   Social History Narrative     Not on file     Social Determinants of Health     Financial Resource Strain: Not on file   Food Insecurity: Not on file   Transportation Needs: Not on file   Physical Activity: Not on file   Stress: Not on file   Social Connections: Not on file   Intimate Partner Violence: Not on file   Housing Stability: Not on file            Allergies:   Contrast dye, Diagnostic x-ray materials, Naproxen, Iodine, and Seasonal allergies         Review of Systems:  From intake questionnaire     Negative 14 system review except as noted on HPI, nurse's note.         Physical Exam:     Patient is a 89 year old  female    Vitals: Blood pressure 104/53, pulse 86, temperature 97  F (36.1  C), temperature source Tympanic, resp. rate 16, height 1.588 m (5' 2.5\"), weight 84.3 kg (185 lb 12.8 oz), SpO2 94 %. Body mass index is 33.44 kg/m .  General Appearance Adult: Alert, no acute distress, oriented for the most part, confused at times   HENT: throat/mouth:normal, good dentition  Lungs: no respiratory distress, or pursed lip breathing  Heart: No obvious jugular venous distension present  Abdomen: soft, nontender, no organomegaly or masses, scars noted  Lymphatics: No cervical or supraclavicular adenopathy  Musculoskeltal: extremities normal, no peripheral edema  Skin: no visible suspicious lesions or rashes  Neuro: Alert, oriented, speech and mentation normal  Psych: affect and mood normal  Gait: Normal  : deferred to cystoscopy        Labs and Pathology:    I reviewed all applicable laboratory and pathology data and went over findings with patient   Ref Range & Units 4 mo ago   Sodium 135 - 145 mmol/L 138    Potassium 3.5 - 5.2 mmol/L 4.7    Chloride 98 - 111 mmol/L 105    Carbon Dioxide (CO2) 20 - 29 mmol/L 25    Anion Gap 5 - 17 8    Glucose 60 - 99 mg/dL 90    Comment: Fasting " Blood Glucose:  60-99 mg/dL   Random Blood Glucose:    mg/dL   Blood Urea Nitrogen (BUN) 7 - 25 mg/dL 23    Creatinine 0.50 - 1.10 mg/dL 1.02    GFR Non African American >=60 mL/min/1.73m2 49 Low     Comment: Glomerular Filtration Rate is estimated from serum creatinine, age, gender and race using the CKD-EPI equation:          G1:  Normal GFR: >=90        G2:  Mildly decreased GFR: 60-89        G3a: Mildly to moderately decreased GFR: 45-59        G3b: Moderately to severely decreased GFR: 30-44        G4:  Severely decreased GFR: 15-29        G5:  Kidney failure GFR: <15   Calcium 8.5 - 10.5 mg/dL 10.0    Resulting [facility]             (CLIA#: [id number])   Specimen Collected: 21  2:59 PM Last Resulted: 21  4:23 PM      Ref Range & Units 4 mo ago   WBC 3.3 - 10.7 devon/L 6.6    RBC 3.87 - 5.08 tril/L 4.08    Hemoglobin 12.1 - 15.0 g/dL 12.2    Hematocrit 35.4 - 44.2 % 38.0    MCV 80 - 100 fL 93    MCH 28 - 33 pg 30    MCHC 32 - 35 g/dL 32    RDW-CV 12 - 15 % 13    Platelets 150 - 400 devon/L 316    Nucleated Red Blood Cells <=0.0 % 0.0    [facility]ing [facility]             (CLIA#: [id number])   Specimen Collected: 21  2:59 PM Last Resulted: 21  3:57 PM     Surgical pathology                        Surgical Pathology Report     Patient Name: GERARDO HEATH                    Accession #: [id number]   Med: Rec.: [id number]                               Phone Number: [phone]   : 1932 (Age: 89)                          Gender: F   Client: 10 - "[employer]              [facility]                                                     Taken: 2021                                                     Received: 2021                                                     Reported: 2021   Physician(s): GUERA BASS     ========================================================================   CLINICAL  HISTORY   Malignant neoplasm of overlapping sites of bladder     OPERATIVE DIAGNOSES       Operation/Specimen: A: Urinary bladder, TURBT left anterior neck TUMOR       PATHOLOGICAL DIAGNOSIS:   A.  Urinary bladder, TURBT left anterior neck TUMOR:    . 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 See   comment    . Tumor invades into muscularis propria.     COMMENT   Sections show poorly differentiated carcinoma with basaloid features. No   definitive malignant surface component is present. Differential diagnosis   includes; urothelial carcinoma with basaloid features and basaloid squamous   cell carcinoma. It is hard to distinguish between these two differential   diagnosis based on morphologic and immunohistochemical features. Although we   favor that, this represent a bladder primary tumor, the possibility of   secondary bladder involvement by tumors from other organs; mainly gynecologic   tract, should be clinically ruled out.           Imaging:    I reviewed all applicable imaging and went over findings with patient.              Outside and Past Medical records    I spent 30 minutes reviewing outside and past medical records including the notes from [place]     Cystoscopy procedure     After sterile preparation and draping of the patient,  a 16-[place] flexible cystoscope was introduced via the urethra.  It was passed without difficulty into the bladder.  The urethra was open without evidence of stricture.  The ureteral orifices were orthotopic.  The prior resection site at the bladder neck appeared intact and the bladder mucosa was evaluated using both antegrade and retroflex views and this showed no evidence of any other lesions or trabeculation.  60 cc of urine was then collected and sent off for cytology.  Scope was then removed and patient tolerated the procedure relatively well.     10 total minutes was spent on cystoscopy procedure alone.             Assessment and Plan:      Assessment     89 year old female with MIBC here to establish care no evidence of recurrence on cystoscopy or her most recent imaging       We discussed the gold standard treatment for MIBC which includes upfront chemotherapy followed by radical cystectomy and pelvic node dissection. We discussed the perioperative course of cystectomy and given her age and functional/cognitive status, I would anticipate a longer term recovery with potential need for prolonged rehabilitation. One of her main reasons for moving to [place] was to be close to her daughter and her family and to spend some quality time with them but I am afraid that recovery from surgery may negatively impact that goal and she also expresses concerns for dealing with prolonged recovery and potentially worsened cognitive function as a result. We also discussed the role of chemoradiation which would spare her the morbidities of surgery and would result in same oncological outcome.     She and her daughter were both agreeable and would like to consider trimodal treatment at this point. She would also like to transfer her care to [facility] and establish her bladder cancer care with one of our medical oncologists.      Plan  Refer to radiation and medical oncology for consideration of chemoradiation     60 total minutes spent on the date of the encounter including direct interaction with the patient, performing chart review, documentation and further activities as noted above, exclusive of the time spent on performing cystoscopy.         Trinity Sullivan MD   Department of Urology   [facility]

## 2022-04-14 NOTE — NURSING NOTE
"Oncology Rooming Note    April 14, 2022 10:34 AM   Christiana Salazar is a 89 year old female who presents for:    Chief Complaint   Patient presents with     Oncology Clinic Visit     New Patient      Initial Vitals: /53   Pulse 86   Temp 97  F (36.1  C) (Tympanic)   Resp 16   Ht 1.588 m (5' 2.5\")   Wt 84.3 kg (185 lb 12.8 oz)   SpO2 94%   BMI 33.44 kg/m   Estimated body mass index is 33.44 kg/m  as calculated from the following:    Height as of this encounter: 1.588 m (5' 2.5\").    Weight as of this encounter: 84.3 kg (185 lb 12.8 oz). Body surface area is 1.93 meters squared.  No Pain (0) Comment: Data Unavailable   No LMP recorded.  Allergies reviewed: Yes  Medications reviewed: Yes    Medications: Medication refills not needed today.  Pharmacy name entered into [facility]: [id number] [place] [address]    Clinical concerns: New Patient        Lucille Nogueira CMA              "

## 2022-04-15 ENCOUNTER — PATIENT OUTREACH (OUTPATIENT)
Dept: ONCOLOGY | Facility: CLINIC | Age: 87
End: 2022-04-15
Payer: MEDICARE

## 2022-04-18 LAB
PATH REPORT.COMMENTS IMP SPEC: ABNORMAL
PATH REPORT.COMMENTS IMP SPEC: ABNORMAL
PATH REPORT.COMMENTS IMP SPEC: YES
PATH REPORT.FINAL DX SPEC: ABNORMAL
PATH REPORT.GROSS SPEC: ABNORMAL
PATH REPORT.MICROSCOPIC SPEC OTHER STN: ABNORMAL
PATH REPORT.RELEVANT HX SPEC: ABNORMAL

## 2022-04-18 PROCEDURE — 88112 CYTOPATH CELL ENHANCE TECH: CPT | Mod: 26

## 2022-04-19 NOTE — PROGRESS NOTES
RECORDS STATUS - ALL OTHER DIAGNOSIS      Action    Action Taken 4/19/2022 10:49AM ABDI   I called the [facility]   Phone: [phone] #[phone]- I was put on hold... the phone disconnected. I called back- waited another 5mins.     [place] Ph: [phone]- I spoke to the  and was transferred to the  imaging dept - I left a detailed .    4/20/2022 2:56pm ABDI   I called the [place] location again Ph: [phone]-phone went to  again .      RECORDS RECEIVED FROM: [facility]/ [facility]    DATE RECEIVED:4/21/2022   NOTES STATUS DETAILS   OFFICE NOTE from referring provider Complete Trinity Cornell MD   OFFICE NOTE from medical oncologist Complete 4/14/2022    Urge incontinence of urine    1/12/2021 [facility]  Malignant neoplasm of overlapping sites of bladder (Primary Dx);     More in [facility]   DISCHARGE SUMMARY from hospital     DISCHARGE REPORT from the ER     OPERATIVE REPORT     MEDICATION LIST Complete [facility]   CLINICAL TRIAL TREATMENTS TO DATE     LABS     PATHOLOGY REPORTS Complete 4/14/2022   Urine, cytology:                 Interpretation:                  Atypical Urothelial Cells.    ANYTHING RELATED TO DIAGNOSIS Complete Labs last updated on 4/14/2022    GENONOMIC TESTING     TYPE:     IMAGING (NEED IMAGES & REPORT)     CT SCANS Complete CT Urogram Abdomen Pelvis 1/10/2022   MRI     MAMMO     ULTRASOUND Request- [facility]  US Renal 4/1/2021 [facility]    PET

## 2022-04-19 NOTE — PROGRESS NOTES
"[facility]    Hematology/Oncology New Patient Note      Today's Date: 4/21/22    Reason for Consultation: Muscle invasive bladder cancer  Referring Provider: Trinity Sullivan MD      HISTORY OF PRESENT ILLNESS: Christiana Salazar is an 89 year old female who is referred for muscle invasive bladder cancer. Past medical history is significant for HTN, HLD, GERD, cognitive decline, and arthritis.    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 Donovan at [place]. She received induction BCG x 6 per her daughter who accompanies her today with complete response but no maintenance treatment was offered. Surveillance cystoscopy in November of 2021 showed evidence of recurrence for which she underwent TURBT on 12/17/2021 by Dr. Orlin Humphrey:      \"TURBT checklist  1) Number of tumors: 1  2) Size of largest tumor: 2 cm  3) Characteristic of tumor: papillary with some sloughing debris, superficial  4) Recurrent vs. Primary: primary  5) Presence of CIS: No  6) Bimanual exam under anesthesia: no  7) Visually complete resection: yes  9) Visualization of detrusor muscle in resection base: no  10) Visual evaluation of perforation: no\"     Pathology revealed poorly differentiated tumor with basaloid features with extensive involvement of detrusor muscle. She had a restaging CT urogram done on 1/4/2022 with no clear evidence of metastatic disease.      Patient lives in her own apartment at a nursing care facility. She is able to perform most ADLs without significant issue. She will meet with Rad Onc in early May 2022. She denies weight loss, fever, or evidence of hematuria.      REVIEW OF SYSTEMS:   A 14 point ROS was reviewed with pertinent positives and negatives in the HPI.        HOME MEDICATIONS:  Current Outpatient Medications   Medication Sig Dispense Refill     albuterol " (PROVENTIL) (2.5 MG/3ML) 0.083% neb solution Inhale 2.5 mg into the lungs       Docusate Sodium (COLACE PO) Take by mouth 2 times daily       fluticasone (FLONASE) 50 MCG/ACT nasal spray 1 spray       furosemide (LASIX) 40 MG tablet Take 20 mg by mouth every other day       HYDROcodone-acetaminophen (NORCO) 5-325 MG tablet hydrocodone 5 mg-acetaminophen 325 mg tablet   Take 1 tablet 3 times a day by oral route as needed for 30 days.       lisinopril (ZESTRIL) 20 MG tablet lisinopril 20 mg tablet       Multiple Vitamin (MULTI-VITAMINS) TABS Take 1 tablet by mouth daily       oxybutynin ER (DITROPAN-XL) 10 MG 24 hr tablet Take 10 mg by mouth       oxybutynin ER (DITROPAN-XL) 5 MG 24 hr tablet Take 1 tablet (5 mg) by mouth daily 30 tablet 0     potassium aminobenzoate 500 MG CAPS capsule Take by mouth 2 times daily       simvastatin (ZOCOR) 40 MG tablet simvastatin 40 mg tablet       traZODone (DESYREL) 50 MG tablet trazodone 50 mg tablet           ALLERGIES:  Allergies   Allergen Reactions     Contrast Dye Anaphylaxis     Diagnostic X-Ray Materials Shortness Of Breath     Naproxen Rash     Face broke out in a rash       Iodine      Seasonal Allergies Other (See Comments)     Sinus drainage         PAST MEDICAL HISTORY:  No past medical history on file.      PAST SURGICAL HISTORY:  No past surgical history on file.      SOCIAL HISTORY:  Social History     Socioeconomic History     Marital status:      Spouse name: Not on file     Number of children: Not on file     Years of education: Not on file     Highest education level: Not on file   Occupational History     Not on file   Tobacco Use     Smoking status: Never Smoker     Smokeless tobacco: Never Used   Substance and Sexual Activity     Alcohol use: Not Currently     Drug use: Not on file     Sexual activity: Not on file   Other Topics Concern     Not on file   Social History Narrative     Not on file     Social Determinants of Health     Financial Resource  "Strain: Not on file   Food Insecurity: Not on file   Transportation Needs: Not on file   Physical Activity: Not on file   Stress: Not on file   Social Connections: Not on file   Intimate Partner Violence: Not on file   Housing Stability: Not on file         FAMILY HISTORY:  No family history on file.      PHYSICAL EXAM:  Vital signs:  /60 (BP Location: Left arm, Patient Position: Sitting, Cuff Size: Adult Large)   Pulse 83   Temp 97.4  F (36.3  C) (Oral)   Resp 18   Ht 1.57 m (5' 1.81\")   Wt 83.9 kg (185 lb)   SpO2 98%   BMI 34.04 kg/m     ECO-1.5  GENERAL/CONSTITUTIONAL: No acute distress.  EYES: Pupils are equal, round, and react to light and accommodation. Extraocular movements intact.  No scleral icterus.  ENT/MOUTH: Neck supple. Oropharynx clear, no mucositis.  LYMPH: No anterior cervical, posterior cervical, supraclavicular, axillary or inguinal adenopathy.   RESPIRATORY: Clear to auscultation bilaterally. No crackles or wheezing.   CARDIOVASCULAR: Regular rate and rhythm without murmurs, gallops, or rubs.  GASTROINTESTINAL: No hepatosplenomegaly, masses, or tenderness. The patient has normal bowel sounds. No guarding.  No distention.  MUSCULOSKELETAL: Warm and well-perfused, no cyanosis, clubbing, or edema.  NEUROLOGIC: Cranial nerves II-XII are intact. Alert, oriented, answers questions appropriately.  INTEGUMENTARY: No rashes or jaundice.      LABS:   Latest Reference Range & Units 22 08:47   Sodium 133 - 144 mmol/L 139   Potassium 3.4 - 5.3 mmol/L 4.4   Chloride 94 - 109 mmol/L 107   Carbon Dioxide 20 - 32 mmol/L 26   Urea Nitrogen 7 - 30 mg/dL 24   Creatinine 0.52 - 1.04 mg/dL 1.02   GFR Estimate >60 mL/min/1.73m2 52 (L) [1]   Calcium 8.5 - 10.1 mg/dL 9.6   Anion Gap 3 - 14 mmol/L 6   Albumin 3.4 - 5.0 g/dL 3.7   Protein Total 6.8 - 8.8 g/dL 7.1   Bilirubin Total 0.2 - 1.3 mg/dL 0.4   Alkaline Phosphatase 40 - 150 U/L 81   ALT 0 - 50 U/L 17   AST 0 - 45 U/L 16   Glucose 70 - 99 " mg/dL 116 (H)   WBC 4.0 - 11.0 10e3/uL 6.2   Hemoglobin 11.7 - 15.7 g/dL 12.4   Hematocrit 35.0 - 47.0 % 38.6   Platelet Count 150 - 450 10e3/uL 318   RBC Count 3.80 - 5.20 10e6/uL 4.11   MCV 78 - 100 fL 94   MCH 26.5 - 33.0 pg 30.2   MCHC 31.5 - 36.5 g/dL 32.1   RDW 10.0 - 15.0 % 12.5   % Neutrophils % 59   % Lymphocytes % 25   % Monocytes % 11   % Eosinophils % 4   % Basophils % 1   Absolute Basophils 0.0 - 0.2 10e3/uL 0.1   Absolute Eosinophils 0.0 - 0.7 10e3/uL 0.3   Absolute Immature Granulocytes <=0.4 10e3/uL 0.0   Absolute Lymphocytes 0.8 - 5.3 10e3/uL 1.6   Absolute Monocytes 0.0 - 1.3 10e3/uL 0.7   % Immature Granulocytes % 0   Absolute Neutrophils 1.6 - 8.3 10e3/uL 3.7   Absolute NRBCs 10e3/uL 0.0   NRBCs per 100 WBC <1 /100 0   INR 0.85 - 1.15  1.07   PTT 22 - 38 Seconds 33         PATHOLOGY:  Surgical pathology                        Surgical Pathology Report     Patient Name: GERARDO HEATH                    Accession #: [id number]   Med: Rec.: [id number]                               Phone Number: [phone]   : 1932 (Age: 89)                          Gender: F   Client: 10 - [employer]              Location: [facility])                                                     Taken: 2021                                                     Received: 2021                                                     Reported: 2021   Physician(s): GUERA BASS     ========================================================================   CLINICAL HISTORY   Malignant neoplasm of overlapping sites of bladder     OPERATIVE DIAGNOSES       Operation/Specimen: A: Urinary bladder, TURBT left anterior neck TUMOR       PATHOLOGICAL DIAGNOSIS:   A.  Urinary bladder, TURBT left anterior neck TUMOR:    . 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 See   comment    . Tumor invades into muscularis propria.     COMMENT   Sections show poorly differentiated carcinoma  with basaloid features. No   definitive malignant surface component is present. Differential diagnosis   includes; urothelial carcinoma with basaloid features and basaloid squamous   cell carcinoma. It is hard to distinguish between these two differential   diagnosis based on morphologic and immunohistochemical features. Although we   favor that, this represent a bladder primary tumor, the possibility of   secondary bladder involvement by tumors from other organs; mainly gynecologic   tract, should be clinically ruled out.            Final Diagnosis   Urine, cytology:                 Interpretation:                  Atypical Urothelial Cells.  See comment.                 Adequacy:                 Satisfactory for evaluation.       IMAGING:  CT Urogram 1/10/22:  IMPRESSION:  1. No B/L hydroureteronephrosis. Mild thickening of the wall of the urinary bladder, a nonspecific finding. No convincing evidence of metastatic disease in the abdomen/pelvis.  2. Sub-6 mm low-attenuation left renal lesion, progressed in size from CT 5/19/15. This is too small to characterize, statistically represents cyst.  3. Stable additional incidental findings.     ASSESSMENT/PLAN:  Shania Salazar is an 89 year old female who is referred for muscle invasive bladder cancer. Past medical history is significant for HTN, HLD, GERD, cognitive decline, and arthritis.    1) Muscle-invasive urothelial carcinoma  -Originally diagnosed in November 2020 (non-muscle invasive, but high-risk) s/p BCG x6.  -Recurrent, muscle-invasive disease diagnosed in November-December 2021.  -Patient has been deemed a poor surgical candidate for cystectomy, to which I am in agreement. Furthermore, patient and daughter would elect against cystectomy.  -We discussed superior locoregional DFS (absolute difference of 12% at 2 years favoring chemoRT). The 5 year overall survival rates are approximately 48% with chemoRT and 35% with radiation alone (N Engl J Med 2012;  366:4852-3999 DOI: 10.1056/XYPKzb5856161).  -We reviewed treatment with mitomycin and fluorouracil:    28-45 day cycle for 1 cycle with concurrent RT  -Mitomycin 12 mg/m2 IV Day 1  -Fluorouracil 500 mg/m2 IV continuous over 24 hours Days 1-5 and 22-26    -Patient and daughter inform me they have already discussed chemoRT vs RT alone and are unlikely to move forward with chemotherapy. They have significant reservation over toxicity and prize quality of life given the fact that patient is still quite active with crocheting and gardening. I discussed this regimen is typically well-tolerated, but side effects due include marrow suppression with increased risk of neutropenia/infection, blood transfusion, diarrhea, nausea/vomiting, cardiac events. She and daughter are open to receiving chemotherapy education, but unlikely to continue with this. Patient will receive radiation, but is unlikely to be accepting of chemotherapy.  -If accepting of chemo, she will require ECHO and port placement.     2) Follow up in 2 weeks to discuss further. Update labs and CT CAP. She will have met with Radiation Oncology by then.     Thank you for referring Christiana Salazar to clinic. Adequate time was dedicated to patient questions and answered to the expressed satisfaction of the patient and family members.         Florina Edgar, DO  Hematology/Oncology  [facility]

## 2022-04-21 ENCOUNTER — PRE VISIT (OUTPATIENT)
Dept: ONCOLOGY | Facility: CLINIC | Age: 87
End: 2022-04-21

## 2022-04-21 ENCOUNTER — PATIENT OUTREACH (OUTPATIENT)
Dept: ONCOLOGY | Facility: CLINIC | Age: 87
End: 2022-04-21

## 2022-04-21 ENCOUNTER — ONCOLOGY VISIT (OUTPATIENT)
Dept: ONCOLOGY | Facility: CLINIC | Age: 87
End: 2022-04-21
Attending: UROLOGY
Payer: MEDICARE

## 2022-04-21 VITALS
RESPIRATION RATE: 18 BRPM | HEIGHT: 62 IN | HEART RATE: 83 BPM | TEMPERATURE: 97.4 F | DIASTOLIC BLOOD PRESSURE: 60 MMHG | OXYGEN SATURATION: 98 % | SYSTOLIC BLOOD PRESSURE: 118 MMHG | BODY MASS INDEX: 34.04 KG/M2 | WEIGHT: 185 LBS

## 2022-04-21 DIAGNOSIS — C67.9 MALIGNANT NEOPLASM OF URINARY BLADDER, UNSPECIFIED SITE (H): Primary | ICD-10-CM

## 2022-04-21 DIAGNOSIS — N39.41 URGE INCONTINENCE OF URINE: ICD-10-CM

## 2022-04-21 DIAGNOSIS — R31.9 HEMATURIA, UNSPECIFIED TYPE: ICD-10-CM

## 2022-04-21 LAB
ALBUMIN SERPL-MCNC: 3.7 G/DL (ref 3.4–5)
ALP SERPL-CCNC: 81 U/L (ref 40–150)
ALT SERPL W P-5'-P-CCNC: 17 U/L (ref 0–50)
ANION GAP SERPL CALCULATED.3IONS-SCNC: 6 MMOL/L (ref 3–14)
APTT PPP: 33 SECONDS (ref 22–38)
AST SERPL W P-5'-P-CCNC: 16 U/L (ref 0–45)
BASOPHILS # BLD AUTO: 0.1 10E3/UL (ref 0–0.2)
BASOPHILS NFR BLD AUTO: 1 %
BILIRUB SERPL-MCNC: 0.4 MG/DL (ref 0.2–1.3)
BUN SERPL-MCNC: 24 MG/DL (ref 7–30)
CALCIUM SERPL-MCNC: 9.6 MG/DL (ref 8.5–10.1)
CHLORIDE BLD-SCNC: 107 MMOL/L (ref 94–109)
CO2 SERPL-SCNC: 26 MMOL/L (ref 20–32)
CREAT SERPL-MCNC: 1.02 MG/DL (ref 0.52–1.04)
EOSINOPHIL # BLD AUTO: 0.3 10E3/UL (ref 0–0.7)
EOSINOPHIL NFR BLD AUTO: 4 %
ERYTHROCYTE [DISTWIDTH] IN BLOOD BY AUTOMATED COUNT: 12.5 % (ref 10–15)
GFR SERPL CREATININE-BSD FRML MDRD: 52 ML/MIN/1.73M2
GLUCOSE BLD-MCNC: 116 MG/DL (ref 70–99)
HCT VFR BLD AUTO: 38.6 % (ref 35–47)
HGB BLD-MCNC: 12.4 G/DL (ref 11.7–15.7)
IMM GRANULOCYTES # BLD: 0 10E3/UL
IMM GRANULOCYTES NFR BLD: 0 %
INR PPP: 1.07 (ref 0.85–1.15)
LYMPHOCYTES # BLD AUTO: 1.6 10E3/UL (ref 0.8–5.3)
LYMPHOCYTES NFR BLD AUTO: 25 %
MCH RBC QN AUTO: 30.2 PG (ref 26.5–33)
MCHC RBC AUTO-ENTMCNC: 32.1 G/DL (ref 31.5–36.5)
MCV RBC AUTO: 94 FL (ref 78–100)
MONOCYTES # BLD AUTO: 0.7 10E3/UL (ref 0–1.3)
MONOCYTES NFR BLD AUTO: 11 %
NEUTROPHILS # BLD AUTO: 3.7 10E3/UL (ref 1.6–8.3)
NEUTROPHILS NFR BLD AUTO: 59 %
NRBC # BLD AUTO: 0 10E3/UL
NRBC BLD AUTO-RTO: 0 /100
PLATELET # BLD AUTO: 318 10E3/UL (ref 150–450)
POTASSIUM BLD-SCNC: 4.4 MMOL/L (ref 3.4–5.3)
PROT SERPL-MCNC: 7.1 G/DL (ref 6.8–8.8)
RBC # BLD AUTO: 4.11 10E6/UL (ref 3.8–5.2)
SODIUM SERPL-SCNC: 139 MMOL/L (ref 133–144)
WBC # BLD AUTO: 6.2 10E3/UL (ref 4–11)

## 2022-04-21 PROCEDURE — 85025 COMPLETE CBC W/AUTO DIFF WBC: CPT | Performed by: INTERNAL MEDICINE

## 2022-04-21 PROCEDURE — 80053 COMPREHEN METABOLIC PANEL: CPT | Performed by: INTERNAL MEDICINE

## 2022-04-21 PROCEDURE — G0463 HOSPITAL OUTPT CLINIC VISIT: HCPCS

## 2022-04-21 PROCEDURE — 99205 OFFICE O/P NEW HI 60 MIN: CPT | Performed by: INTERNAL MEDICINE

## 2022-04-21 PROCEDURE — 85730 THROMBOPLASTIN TIME PARTIAL: CPT | Performed by: INTERNAL MEDICINE

## 2022-04-21 PROCEDURE — 85610 PROTHROMBIN TIME: CPT | Performed by: INTERNAL MEDICINE

## 2022-04-21 PROCEDURE — 36415 COLL VENOUS BLD VENIPUNCTURE: CPT | Performed by: INTERNAL MEDICINE

## 2022-04-21 ASSESSMENT — PAIN SCALES - GENERAL: PAINLEVEL: NO PAIN (0)

## 2022-04-21 NOTE — LETTER
"    4/21/2022         RE: [place]  [address] [facility] [address]        Dear Colleague,    Thank you for referring your patient, Christiana Salazar, to the [facility]. Please see a copy of my visit note below.    [facility]    Hematology/Oncology New Patient Note      Today's Date: 4/21/22    Reason for Consultation: Muscle invasive bladder cancer  Referring Provider: Trinity Sullivan MD      HISTORY OF PRESENT ILLNESS: Christiana Salazar is an 89 year old female who is referred for muscle invasive bladder cancer. Past medical history is significant for HTN, HLD, GERD, cognitive decline, and arthritis.    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 Donovan at [place]. She received induction BCG x 6 per her daughter who accompanies her today with complete response but no maintenance treatment was offered. Surveillance cystoscopy in November of 2021 showed evidence of recurrence for which she underwent TURBT on 12/17/2021 by Dr. Ordaz Kam:      \"TURBT checklist  1) Number of tumors: 1  2) Size of largest tumor: 2 cm  3) Characteristic of tumor: papillary with some sloughing debris, superficial  4) Recurrent vs. Primary: primary  5) Presence of CIS: No  6) Bimanual exam under anesthesia: no  7) Visually complete resection: yes  9) Visualization of detrusor muscle in resection base: no  10) Visual evaluation of perforation: no\"     Pathology revealed poorly differentiated tumor with basaloid features with extensive involvement of detrusor muscle. She had a restaging CT urogram done on 1/4/2022 with no clear evidence of metastatic disease.      Patient lives in her own apartment at a nursing care facility. She is able to perform most ADLs without significant issue. She will meet with Rad Onc in early May 2022. She denies weight " loss, fever, or evidence of hematuria.      REVIEW OF SYSTEMS:   A 14 point ROS was reviewed with pertinent positives and negatives in the HPI.        HOME MEDICATIONS:  Current Outpatient Medications   Medication Sig Dispense Refill     albuterol (PROVENTIL) (2.5 MG/3ML) 0.083% neb solution Inhale 2.5 mg into the lungs       Docusate Sodium (COLACE PO) Take by mouth 2 times daily       fluticasone (FLONASE) 50 MCG/ACT nasal spray 1 spray       furosemide (LASIX) 40 MG tablet Take 20 mg by mouth every other day       HYDROcodone-acetaminophen (NORCO) 5-325 MG tablet hydrocodone 5 mg-acetaminophen 325 mg tablet   Take 1 tablet 3 times a day by oral route as needed for 30 days.       lisinopril (ZESTRIL) 20 MG tablet lisinopril 20 mg tablet       Multiple Vitamin (MULTI-VITAMINS) TABS Take 1 tablet by mouth daily       oxybutynin ER (DITROPAN-XL) 10 MG 24 hr tablet Take 10 mg by mouth       oxybutynin ER (DITROPAN-XL) 5 MG 24 hr tablet Take 1 tablet (5 mg) by mouth daily 30 tablet 0     potassium aminobenzoate 500 MG CAPS capsule Take by mouth 2 times daily       simvastatin (ZOCOR) 40 MG tablet simvastatin 40 mg tablet       traZODone (DESYREL) 50 MG tablet trazodone 50 mg tablet           ALLERGIES:  Allergies   Allergen Reactions     Contrast Dye Anaphylaxis     Diagnostic X-Ray Materials Shortness Of Breath     Naproxen Rash     Face broke out in a rash       Iodine      Seasonal Allergies Other (See Comments)     Sinus drainage         PAST MEDICAL HISTORY:  No past medical history on file.      PAST SURGICAL HISTORY:  No past surgical history on file.      SOCIAL HISTORY:  Social History     Socioeconomic History     Marital status:      Spouse name: Not on file     Number of children: Not on file     Years of education: Not on file     Highest education level: Not on file   Occupational History     Not on file   Tobacco Use     Smoking status: Never Smoker     Smokeless tobacco: Never Used   Substance  "and Sexual Activity     Alcohol use: Not Currently     Drug use: Not on file     Sexual activity: Not on file   Other Topics Concern     Not on file   Social History Narrative     Not on file     Social Determinants of Health     Financial Resource Strain: Not on file   Food Insecurity: Not on file   Transportation Needs: Not on file   Physical Activity: Not on file   Stress: Not on file   Social Connections: Not on file   Intimate Partner Violence: Not on file   Housing Stability: Not on file         FAMILY HISTORY:  No family history on file.      PHYSICAL EXAM:  Vital signs:  /60 (BP Location: Left arm, Patient Position: Sitting, Cuff Size: Adult Large)   Pulse 83   Temp 97.4  F (36.3  C) (Oral)   Resp 18   Ht 1.57 m (5' 1.81\")   Wt 83.9 kg (185 lb)   SpO2 98%   BMI 34.04 kg/m     ECO-1.5  GENERAL/CONSTITUTIONAL: No acute distress.  EYES: Pupils are equal, round, and react to light and accommodation. Extraocular movements intact.  No scleral icterus.  ENT/MOUTH: Neck supple. Oropharynx clear, no mucositis.  LYMPH: No anterior cervical, posterior cervical, supraclavicular, axillary or inguinal adenopathy.   RESPIRATORY: Clear to auscultation bilaterally. No crackles or wheezing.   CARDIOVASCULAR: Regular rate and rhythm without murmurs, gallops, or rubs.  GASTROINTESTINAL: No hepatosplenomegaly, masses, or tenderness. The patient has normal bowel sounds. No guarding.  No distention.  MUSCULOSKELETAL: Warm and well-perfused, no cyanosis, clubbing, or edema.  NEUROLOGIC: Cranial nerves II-XII are intact. Alert, oriented, answers questions appropriately.  INTEGUMENTARY: No rashes or jaundice.      LABS:   Latest Reference Range & Units 22 08:47   Sodium 133 - 144 mmol/L 139   Potassium 3.4 - 5.3 mmol/L 4.4   Chloride 94 - 109 mmol/L 107   Carbon Dioxide 20 - 32 mmol/L 26   Urea Nitrogen 7 - 30 mg/dL 24   Creatinine 0.52 - 1.04 mg/dL 1.02   GFR Estimate >60 mL/min/1.73m2 52 (L) [1]   Calcium 8.5 " - 10.1 mg/dL 9.6   Anion Gap 3 - 14 mmol/L 6   Albumin 3.4 - 5.0 g/dL 3.7   Protein Total 6.8 - 8.8 g/dL 7.1   Bilirubin Total 0.2 - 1.3 mg/dL 0.4   Alkaline Phosphatase 40 - 150 U/L 81   ALT 0 - 50 U/L 17   AST 0 - 45 U/L 16   Glucose 70 - 99 mg/dL 116 (H)   WBC 4.0 - 11.0 10e3/uL 6.2   Hemoglobin 11.7 - 15.7 g/dL 12.4   Hematocrit 35.0 - 47.0 % 38.6   Platelet Count 150 - 450 10e3/uL 318   RBC Count 3.80 - 5.20 10e6/uL 4.11   MCV 78 - 100 fL 94   MCH 26.5 - 33.0 pg 30.2   MCHC 31.5 - 36.5 g/dL 32.1   RDW 10.0 - 15.0 % 12.5   % Neutrophils % 59   % Lymphocytes % 25   % Monocytes % 11   % Eosinophils % 4   % Basophils % 1   Absolute Basophils 0.0 - 0.2 10e3/uL 0.1   Absolute Eosinophils 0.0 - 0.7 10e3/uL 0.3   Absolute Immature Granulocytes <=0.4 10e3/uL 0.0   Absolute Lymphocytes 0.8 - 5.3 10e3/uL 1.6   Absolute Monocytes 0.0 - 1.3 10e3/uL 0.7   % Immature Granulocytes % 0   Absolute Neutrophils 1.6 - 8.3 10e3/uL 3.7   Absolute NRBCs 10e3/uL 0.0   NRBCs per 100 WBC <1 /100 0   INR 0.85 - 1.15  1.07   PTT 22 - 38 Seconds 33         PATHOLOGY:  Surgical pathology                        Surgical Pathology Report     Patient Name: GERARDO HEATH                    Accession #: [id number]   Med: Rec.: [id number]                               Phone Number: [phone]   : 1932 (Age: 89)                          Gender: F   Client: 10 - [place]              Location: [facility]                                                     Taken: 2021                                                     Received: 2021                                                     Reported: 2021   Physician(s): GUERA BASS     ========================================================================   CLINICAL HISTORY   Malignant neoplasm of overlapping sites of bladder     OPERATIVE DIAGNOSES       Operation/Specimen: A: Urinary bladder, TURBT left anterior neck TUMOR       PATHOLOGICAL  DIAGNOSIS:   A.  Urinary bladder, TURBT left anterior neck TUMOR:    . 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 See   comment    . Tumor invades into muscularis propria.     COMMENT   Sections show poorly differentiated carcinoma with basaloid features. No   definitive malignant surface component is present. Differential diagnosis   includes; urothelial carcinoma with basaloid features and basaloid squamous   cell carcinoma. It is hard to distinguish between these two differential   diagnosis based on morphologic and immunohistochemical features. Although we   favor that, this represent a bladder primary tumor, the possibility of   secondary bladder involvement by tumors from other organs; mainly gynecologic   tract, should be clinically ruled out.            Final Diagnosis   Urine, cytology:                 Interpretation:                  Atypical Urothelial Cells.  See comment.                 Adequacy:                 Satisfactory for evaluation.       IMAGING:  CT Urogram 1/10/22:  IMPRESSION:  1. No B/L hydroureteronephrosis. Mild thickening of the wall of the urinary bladder, a nonspecific finding. No convincing evidence of metastatic disease in the abdomen/pelvis.  2. Sub-6 mm low-attenuation left renal lesion, progressed in size from CT 5/19/15. This is too small to characterize, statistically represents cyst.  3. Stable additional incidental findings.     ASSESSMENT/PLAN:  Shania Salazar is an 89 year old female who is referred for muscle invasive bladder cancer. Past medical history is significant for HTN, HLD, GERD, cognitive decline, and arthritis.    1) Muscle-invasive urothelial carcinoma  -Originally diagnosed in November 2020 (non-muscle invasive, but high-risk) s/p BCG x6.  -Recurrent, muscle-invasive disease diagnosed in November-December 2021.  -Patient has been deemed a poor surgical candidate for cystectomy, to which I am in agreement. Furthermore, patient and  daughter would elect against cystectomy.  -We discussed superior locoregional DFS (absolute difference of 12% at 2 years favoring chemoRT). The 5 year overall survival rates are approximately 48% with chemoRT and 35% with radiation alone (N Engl J Med 2012; 366:8694-9962 DOI: 10.1056/QGLHli1438439).  -We reviewed treatment with mitomycin and fluorouracil:    28-45 day cycle for 1 cycle with concurrent RT  -Mitomycin 12 mg/m2 IV Day 1  -Fluorouracil 500 mg/m2 IV continuous over 24 hours Days 1-5 and 22-26    -Patient and daughter inform me they have already discussed chemoRT vs RT alone and are unlikely to move forward with chemotherapy. They have significant reservation over toxicity and prize quality of life given the fact that patient is still quite active with crocheting and gardening. I discussed this regimen is typically well-tolerated, but side effects due include marrow suppression with increased risk of neutropenia/infection, blood transfusion, diarrhea, nausea/vomiting, cardiac events. She and daughter are open to receiving chemotherapy education, but unlikely to continue with this. Patient will receive radiation, but is unlikely to be accepting of chemotherapy.  -If accepting of chemo, she will require ECHO and port placement.     2) Follow up in 2 weeks to discuss further. Update labs and CT CAP. She will have met with Radiation Oncology by then.     Thank you for referring Christiana Salazar to clinic. Adequate time was dedicated to patient questions and answered to the expressed satisfaction of the patient and family members.         Florina Edgar DO  Hematology/Oncology  [facility]      Oncology Rooming Note    April 21, 2022 8:03 AM   Christiana Salazar is a 89 year old female who presents for:    No chief complaint on file.    Initial Vitals: /60 (BP Location: Left arm, Patient Position: Sitting, Cuff Size: Adult Large)   Pulse 83   Temp 97.4  F (36.3  C) (Oral)   Resp 18    "Ht 1.57 m (5' 1.81\")   Wt 83.9 kg (185 lb)   SpO2 98%   BMI 34.04 kg/m   Estimated body mass index is 34.04 kg/m  as calculated from the following:    Height as of this encounter: 1.57 m (5' 1.81\").    Weight as of this encounter: 83.9 kg (185 lb). Body surface area is 1.91 meters squared.  No Pain (0) Comment: Data Unavailable   No LMP recorded.  Allergies reviewed: Yes  Medications reviewed: Yes    Medications: Medication refills not needed today.  Pharmacy name entered into Pre[facility]: [id number] [place] [address]    Clinical concerns: no        Kayce Shields, [facility]                    Again, thank you for allowing me to participate in the care of your patient.        Sincerely,        Florina Edgar, DO    "

## 2022-04-21 NOTE — PATIENT INSTRUCTIONS
-Labs today as ordered.  -CT CAP to be done within the next week for staging. Patient requests [place].  -Follow up in clinic on 5/6/22 with Dr. Edgar in [place].  
23-Jan-2020 15:34

## 2022-04-21 NOTE — PROGRESS NOTES
"Oncology Rooming Note    April 21, 2022 8:03 AM   Christiana Salazar is a 89 year old female who presents for:    No chief complaint on file.    Initial Vitals: /60 (BP Location: Left arm, Patient Position: Sitting, Cuff Size: Adult Large)   Pulse 83   Temp 97.4  F (36.3  C) (Oral)   Resp 18   Ht 1.57 m (5' 1.81\")   Wt 83.9 kg (185 lb)   SpO2 98%   BMI 34.04 kg/m   Estimated body mass index is 34.04 kg/m  as calculated from the following:    Height as of this encounter: 1.57 m (5' 1.81\").    Weight as of this encounter: 83.9 kg (185 lb). Body surface area is 1.91 meters squared.  No Pain (0) Comment: Data Unavailable   No LMP recorded.  Allergies reviewed: Yes  Medications reviewed: Yes    Medications: Medication refills not needed today.  Pharmacy name entered into [facility]: [id number] [place] [address]    Clinical concerns: no        Kayce Shields CMA                "

## 2022-04-21 NOTE — PROGRESS NOTES
Patient was given information on Mitomycin, 5 Fluorouracil per Dr. Edgar. She was also given information  On a port which she would need for continuous 5 Fluorouracil. At this time Christiana states that she does not want to pursue chemotherapy. She has a meeting with radiation therapy and will decide if she would like to proceed with radiation after that. Naye Esquivel RN,BSN,OCN,CBCN

## 2022-04-25 ENCOUNTER — TELEPHONE (OUTPATIENT)
Dept: ONCOLOGY | Facility: CLINIC | Age: 87
End: 2022-04-25
Payer: MEDICARE

## 2022-04-25 ENCOUNTER — TELEPHONE (OUTPATIENT)
Dept: UROLOGY | Facility: CLINIC | Age: 87
End: 2022-04-25
Payer: MEDICARE

## 2022-04-25 DIAGNOSIS — T50.8X5A ALLERGIC REACTION TO CONTRAST MATERIAL, INITIAL ENCOUNTER: Primary | ICD-10-CM

## 2022-04-25 RX ORDER — METHYLPREDNISOLONE 32 MG/1
TABLET ORAL
Qty: 2 TABLET | Refills: 0 | Status: SHIPPED | OUTPATIENT
Start: 2022-04-25 | End: 2023-07-06

## 2022-04-25 NOTE — TELEPHONE ENCOUNTER
[facility] Call Center    Phone Message    May a detailed message be left on voicemail: yes     Reason for Call: Other: Rachelle called to get the referral for the provider who can do the botox in the bladder.  Please give Rachelle a call to discuss     Action Taken: Message routed to:  Other: URO    Travel Screening: Not Applicable

## 2022-04-27 LAB
PATH REPORT.COMMENTS IMP SPEC: NORMAL
PATH REPORT.FINAL DX SPEC: NORMAL
PATH REPORT.GROSS SPEC: NORMAL
PATH REPORT.RELEVANT HX SPEC: NORMAL

## 2022-04-27 PROCEDURE — 88120 CYTP URNE 3-5 PROBES EA SPEC: CPT | Mod: 26 | Performed by: PATHOLOGY

## 2022-04-28 NOTE — TELEPHONE ENCOUNTER
Writer spoke with patient daughter today about questions regarding bladder botox. Daughter was given Lea Mabry MD, [facility] number [phone] to schedule consultation. Patient scheduled for radiation consult next Tuesday 5/3/22. Daughter had no further questions or concerns and was grateful for the call today.     Kedar Stiles, RN, BSN.  RN Care Coordinator     [facility]   [phone]

## 2022-05-04 ENCOUNTER — HOSPITAL ENCOUNTER (OUTPATIENT)
Dept: CT IMAGING | Facility: CLINIC | Age: 87
Discharge: HOME | End: 2022-05-04
Attending: INTERNAL MEDICINE | Admitting: INTERNAL MEDICINE
Payer: MEDICARE

## 2022-05-04 DIAGNOSIS — C67.9 MALIGNANT NEOPLASM OF URINARY BLADDER, UNSPECIFIED SITE (H): ICD-10-CM

## 2022-05-04 PROCEDURE — 250N000011 HC RX IP 250 OP 636: Performed by: INTERNAL MEDICINE

## 2022-05-04 PROCEDURE — 74177 CT ABD & PELVIS W/CONTRAST: CPT | Mod: MG

## 2022-05-04 PROCEDURE — 250N000009 HC RX 250: Performed by: INTERNAL MEDICINE

## 2022-05-04 RX ORDER — IOPAMIDOL 755 MG/ML
500 INJECTION, SOLUTION INTRAVASCULAR ONCE
Status: COMPLETED | OUTPATIENT
Start: 2022-05-04 | End: 2022-05-04

## 2022-05-04 RX ADMIN — IOPAMIDOL 75 ML: 755 INJECTION, SOLUTION INTRAVENOUS at 14:24

## 2022-05-04 RX ADMIN — SODIUM CHLORIDE 55 ML: 9 INJECTION, SOLUTION INTRAVENOUS at 14:25

## 2022-05-05 ENCOUNTER — DOCUMENTATION ONLY (OUTPATIENT)
Dept: RADIATION ONCOLOGY | Facility: CLINIC | Age: 87
End: 2022-05-05
Payer: MEDICARE

## 2022-05-05 NOTE — PROGRESS NOTES
I called Ms. Salazar's contact number (her daughter, Rachelle) to discuss her missed appointment for radiation oncology consultation this week. I asked that she please call back our clinic and I would be happy to discuss options or answer questions over the phone. From messages with the heme/onc team and review of recent documentation, it appears Ms. Salazar may not be a candidate or interested in receiving treatments other than radiotherapy, so I was attempting to discuss radiotherapy options.    The patient was discussed with staff, Dr. Lazar.    Ceferino Tapia MD  PGY-5 Radiation Oncology  Clinic: [phone]  Pager: [phone]

## 2022-05-09 ENCOUNTER — ONCOLOGY VISIT (OUTPATIENT)
Dept: ONCOLOGY | Facility: CLINIC | Age: 87
End: 2022-05-09
Attending: INTERNAL MEDICINE
Payer: MEDICARE

## 2022-05-09 VITALS
DIASTOLIC BLOOD PRESSURE: 62 MMHG | RESPIRATION RATE: 16 BRPM | HEIGHT: 63 IN | TEMPERATURE: 97 F | OXYGEN SATURATION: 96 % | HEART RATE: 78 BPM | SYSTOLIC BLOOD PRESSURE: 116 MMHG | WEIGHT: 186 LBS | BODY MASS INDEX: 32.96 KG/M2

## 2022-05-09 DIAGNOSIS — C67.9 MALIGNANT NEOPLASM OF URINARY BLADDER, UNSPECIFIED SITE (H): Primary | ICD-10-CM

## 2022-05-09 PROCEDURE — G0463 HOSPITAL OUTPT CLINIC VISIT: HCPCS

## 2022-05-09 PROCEDURE — 99214 OFFICE O/P EST MOD 30 MIN: CPT | Performed by: INTERNAL MEDICINE

## 2022-05-09 ASSESSMENT — PAIN SCALES - GENERAL: PAINLEVEL: NO PAIN (0)

## 2022-05-09 NOTE — NURSING NOTE
"Oncology Rooming Note    May 9, 2022 8:37 AM   Christiana Salazar is a 89 year old female who presents for:    Chief Complaint   Patient presents with     Oncology Clinic Visit     Malignant neoplasm of urinary bladder, unspecified site     Initial Vitals: /62   Pulse 78   Temp 97  F (36.1  C) (Tympanic)   Resp 16   Ht 1.588 m (5' 2.5\")   Wt 84.4 kg (186 lb)   SpO2 96%   BMI 33.48 kg/m   Estimated body mass index is 33.48 kg/m  as calculated from the following:    Height as of this encounter: 1.588 m (5' 2.5\").    Weight as of this encounter: 84.4 kg (186 lb). Body surface area is 1.93 meters squared.  No Pain (0) Comment: Data Unavailable   No LMP recorded.  Allergies reviewed: Yes  Medications reviewed: Yes    Medications: Medication refills not needed today.  Pharmacy name entered into [facility]: [id number] [place] [id number] [facility] [address]    Clinical concerns: follow up       Lucille Nogueira CMA              "

## 2022-05-09 NOTE — PROGRESS NOTES
"[facility]    Hematology/Oncology Established Patient Follow-up Note    Treatment Summary:      Today's Date: 5/9/22    Reason for Follow-up: Muscle invasive bladder cancer  Referring Provider: Trinity Sullivan MD      HISTORY OF PRESENT ILLNESS: Christiana Salazar is an 89 year old female who is referred for muscle invasive bladder cancer. Past medical history is significant for HTN, HLD, GERD, cognitive decline, and arthritis.     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 Donovan at [place]. She received induction BCG x 6 per her daughter who accompanies her today with complete response but no maintenance treatment was offered. Surveillance cystoscopy in November of 2021 showed evidence of recurrence for which she underwent TURBT on 12/17/2021 by Dr. Orlin Humphrey:      \"TURBT checklist  1) Number of tumors: 1  2) Size of largest tumor: 2 cm  3) Characteristic of tumor: papillary with some sloughing debris, superficial  4) Recurrent vs. Primary: primary  5) Presence of CIS: No  6) Bimanual exam under anesthesia: no  7) Visually complete resection: yes  9) Visualization of detrusor muscle in resection base: no  10) Visual evaluation of perforation: no\"     Pathology revealed poorly differentiated tumor with basaloid features with extensive involvement of detrusor muscle. She had a restaging CT urogram done on 1/4/2022 with no clear evidence of metastatic disease.      Patient lives in her own apartment at a nursing care facility. She is able to perform most ADLs without significant issue. She will meet with Rad Onc in early May 2022. She denies weight loss, fever, or evidence of hematuria.      INTERIM HISTORY:  Referred to Radiology downtown. Patient and daughter stated they arrived in the parking structure and decided against meeting with the team as they were " overwhelmed and did not want to receive therapy downtown. No complaints of hematuria currently. No pain, weight loss.      REVIEW OF SYSTEMS:   A 14 point ROS was reviewed with pertinent positives and negatives in the HPI.       HOME MEDICATIONS:  Current Outpatient Medications   Medication Sig Dispense Refill     albuterol (PROVENTIL) (2.5 MG/3ML) 0.083% neb solution Inhale 2.5 mg into the lungs       Docusate Sodium (COLACE PO) Take by mouth 2 times daily       fluticasone (FLONASE) 50 MCG/ACT nasal spray 1 spray       furosemide (LASIX) 40 MG tablet Take 20 mg by mouth every other day       HYDROcodone-acetaminophen (NORCO) 5-325 MG tablet hydrocodone 5 mg-acetaminophen 325 mg tablet   Take 1 tablet 3 times a day by oral route as needed for 30 days.       lisinopril (ZESTRIL) 20 MG tablet lisinopril 20 mg tablet       methylPREDNISolone (MEDROL) 32 MG tablet Take 1 tablet (32mg) 12 hours and 2 hours prior to CT scan. 2 tablet 0     Multiple Vitamin (MULTI-VITAMINS) TABS Take 1 tablet by mouth daily       oxybutynin ER (DITROPAN-XL) 10 MG 24 hr tablet Take 10 mg by mouth       oxybutynin ER (DITROPAN-XL) 5 MG 24 hr tablet Take 1 tablet (5 mg) by mouth daily 30 tablet 0     potassium aminobenzoate 500 MG CAPS capsule Take by mouth 2 times daily       simvastatin (ZOCOR) 40 MG tablet simvastatin 40 mg tablet       traZODone (DESYREL) 50 MG tablet trazodone 50 mg tablet           ALLERGIES:  Allergies   Allergen Reactions     Contrast Dye Anaphylaxis     Diagnostic X-Ray Materials Shortness Of Breath     Naproxen Rash     Face broke out in a rash       Iodine      Seasonal Allergies Other (See Comments)     Sinus drainage         PAST MEDICAL HISTORY:  History reviewed. No pertinent past medical history.      PAST SURGICAL HISTORY:  History reviewed. No pertinent surgical history.      SOCIAL HISTORY:  Social History     Socioeconomic History     Marital status:      Spouse name: Not on file     Number of  "children: Not on file     Years of education: Not on file     Highest education level: Not on file   Occupational History     Not on file   Tobacco Use     Smoking status: Never Smoker     Smokeless tobacco: Never Used   Substance and Sexual Activity     Alcohol use: Not Currently     Drug use: Not on file     Sexual activity: Not on file   Other Topics Concern     Not on file   Social History Narrative     Not on file     Social Determinants of Health     Financial Resource Strain: Not on file   Food Insecurity: Not on file   Transportation Needs: Not on file   Physical Activity: Not on file   Stress: Not on file   Social Connections: Not on file   Intimate Partner Violence: Not on file   Housing Stability: Not on file         FAMILY HISTORY:  History reviewed. No pertinent family history.      PHYSICAL EXAM:  Vital signs:  /62   Pulse 78   Temp 97  F (36.1  C) (Tympanic)   Resp 16   Ht 1.588 m (5' 2.5\")   Wt 84.4 kg (186 lb)   SpO2 96%   BMI 33.48 kg/m     ECO-1.5  GENERAL/CONSTITUTIONAL: No acute distress.  EYES: Pupils are equal, round, and react to light and accommodation. Extraocular movements intact.  No scleral icterus.  ENT/MOUTH: Neck supple. Oropharynx clear, no mucositis.  LYMPH: No anterior cervical, posterior cervical, supraclavicular, axillary or inguinal adenopathy.   RESPIRATORY: Clear to auscultation bilaterally. No crackles or wheezing.   CARDIOVASCULAR: Regular rate and rhythm without murmurs, gallops, or rubs.  GASTROINTESTINAL: No hepatosplenomegaly, masses, or tenderness. The patient has normal bowel sounds. No guarding.  No distention.  MUSCULOSKELETAL: Warm and well-perfused, no cyanosis, clubbing, or edema.  NEUROLOGIC: Cranial nerves II-XII are intact. Alert, oriented, answers questions appropriately.  INTEGUMENTARY: No rashes or jaundice.      LABS:  CBC RESULTS:   Recent Labs   Lab Test 22  0847   WBC 6.2   RBC 4.11   HGB 12.4   HCT 38.6   MCV 94   MCH 30.2   MCHC " 32.1   RDW 12.5          Recent Labs   Lab Test 22  0847      POTASSIUM 4.4   CHLORIDE 107   CO2 26   ANIONGAP 6   *   BUN 24   CR 1.02   LANDY 9.6         PATHOLOGY:  Surgical pathology                        Surgical Pathology Report     Patient Name: GERARDO HEATH                    Accession #: [id number]   Med: Rec.: [id number]                               Phone Number: [phone]   : 1932 (Age: 89)                          Gender: F   Client: 10 - [facility] [employer]              Location: [facility]                                                     Taken: 2021                                                     Received: 2021                                                     Reported: 2021   Physician(s): GUERA BASS     ========================================================================   CLINICAL HISTORY   Malignant neoplasm of overlapping sites of bladder     OPERATIVE DIAGNOSES       Operation/Specimen: A: Urinary bladder, TURBT left anterior neck TUMOR       PATHOLOGICAL DIAGNOSIS:   A.  Urinary bladder, TURBT left anterior neck TUMOR:    . 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 See   comment    . Tumor invades into muscularis propria.     COMMENT   Sections show poorly differentiated carcinoma with basaloid features. No   definitive malignant surface component is present. Differential diagnosis   includes; urothelial carcinoma with basaloid features and basaloid squamous   cell carcinoma. It is hard to distinguish between these two differential   diagnosis based on morphologic and immunohistochemical features. Although we   favor that, this represent a bladder primary tumor, the possibility of   secondary bladder involvement by tumors from other organs; mainly gynecologic   tract, should be clinically ruled out.              Final Diagnosis   Urine,  cytology:                 Interpretation:                  Atypical Urothelial Cells.  See comment.                 Adequacy:                 Satisfactory for evaluation.         IMAGING:  CT Urogram 1/10/22:  IMPRESSION:  1. No B/L hydroureteronephrosis. Mild thickening of the wall of the urinary bladder, a nonspecific finding. No convincing evidence of metastatic disease in the abdomen/pelvis.  2. Sub-6 mm low-attenuation left renal lesion, progressed in size from CT 5/19/15. This is too small to characterize, statistically represents cyst.  3. Stable additional incidental findings.     CT CAP 5/4/22:                                                                 IMPRESSION:  1.  Mosaic attenuation pattern of the lungs is nonspecific but may  indicate small vessel or small airways disease.  2.  There are a few scattered pulmonary nodules measuring less than 4  mm. No prior chest CT available for comparison. Attention at  follow-up.  3.  No evidence of metastatic malignancy in the abdomen or pelvis.     ASSESSMENT/PLAN:  Shania Salazar is an 89 year old female who is referred for muscle invasive bladder cancer. Past medical history is significant for HTN, HLD, GERD, cognitive decline, and arthritis.     1) Muscle-invasive urothelial carcinoma  -Originally diagnosed in November 2020 (non-muscle invasive, but high-risk) s/p BCG x6.  -Recurrent, muscle-invasive disease diagnosed in November-December 2021.  -Patient has been deemed a poor surgical candidate for cystectomy, to which I am in agreement. Furthermore, patient and daughter would elect against cystectomy.  -We discussed superior locoregional DFS (absolute difference of 12% at 2 years favoring chemoRT). The 5 year overall survival rates are approximately 48% with chemoRT and 35% with radiation alone (N Engl J Med 2012; 366:7009-4141 DOI: 10.1056/UFKDup8053291).  -We reviewed treatment with mitomycin and fluorouracil:     28-45 day cycle for 1 cycle with  concurrent RT  -Mitomycin 12 mg/m2 IV Day 1  -Fluorouracil 500 mg/m2 IV continuous over 24 hours Days 1-5 and 22-26     -Patient and daughter inform me they have come to the conclusion they will not move forward with systemic chemotherapy. They have significant reservation over toxicity and prize quality of life given the fact that patient is still quite active with crocheting and gardening. I discussed this regimen is typically well-tolerated, but side effects due include marrow suppression with increased risk of neutropenia/infection, blood transfusion, diarrhea, nausea/vomiting, cardiac events.   -Initially, patient stating she would not be willing to move forward with radiation alone, but I have asked if she would be willing to reschedule with Radiation Oncology as she has not had evaluation to make a fully informed decision. Patient continues to be open to discussion with Radiation Oncology. She canceled her visit previously as this was set for [facility]. Referral placed for [facility]O.     2) Follow up in 2-3 weeks to discuss further.        Thank you for referring Christiana Salazar to clinic. Adequate time was dedicated to patient questions and answered to the expressed satisfaction of the patient and family members.            Florina Edgar,   Hematology/Oncology  [facility]

## 2022-05-09 NOTE — LETTER
"    5/9/2022         RE: Christian[place] Salazar  [address] [address] [facility] [address]        Dear Colleague,    Thank you for referring your patient, Christiana Salazar, to the [facility]. Please see a copy of my visit note below.    [facility]    Hematology/Oncology Established Patient Follow-up Note    Treatment Summary:      Today's Date: 5/9/22    Reason for Follow-up: Muscle invasive bladder cancer  Referring Provider: Trinity Sullivan MD      HISTORY OF PRESENT ILLNESS: Christiana Salazar is an 89 year old female who is referred for muscle invasive bladder cancer. Past medical history is significant for HTN, HLD, GERD, cognitive decline, and arthritis.     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 Donovan at [place]. She received induction BCG x 6 per her daughter who accompanies her today with complete response but no maintenance treatment was offered. Surveillance cystoscopy in November of 2021 showed evidence of recurrence for which she underwent TURBT on 12/17/2021 by Dr. Orlin Humphrey:      \"TURBT checklist  1) Number of tumors: 1  2) Size of largest tumor: 2 cm  3) Characteristic of tumor: papillary with some sloughing debris, superficial  4) Recurrent vs. Primary: primary  5) Presence of CIS: No  6) Bimanual exam under anesthesia: no  7) Visually complete resection: yes  9) Visualization of detrusor muscle in resection base: no  10) Visual evaluation of perforation: no\"     Pathology revealed poorly differentiated tumor with basaloid features with extensive involvement of detrusor muscle. She had a restaging CT urogram done on 1/4/2022 with no clear evidence of metastatic disease.      Patient lives in her own apartment at a nursing care facility. She is able to perform most ADLs without significant issue. She will meet with Rad " Onc in early May 2022. She denies weight loss, fever, or evidence of hematuria.      INTERIM HISTORY:  Referred to Radiology downtown. Patient and daughter stated they arrived in the parking structure and decided against meeting with the team as they were overwhelmed and did not want to receive therapy downtown. No complaints of hematuria currently. No pain, weight loss.      REVIEW OF SYSTEMS:   A 14 point ROS was reviewed with pertinent positives and negatives in the HPI.       HOME MEDICATIONS:  Current Outpatient Medications   Medication Sig Dispense Refill     albuterol (PROVENTIL) (2.5 MG/3ML) 0.083% neb solution Inhale 2.5 mg into the lungs       Docusate Sodium (COLACE PO) Take by mouth 2 times daily       fluticasone (FLONASE) 50 MCG/ACT nasal spray 1 spray       furosemide (LASIX) 40 MG tablet Take 20 mg by mouth every other day       HYDROcodone-acetaminophen (NORCO) 5-325 MG tablet hydrocodone 5 mg-acetaminophen 325 mg tablet   Take 1 tablet 3 times a day by oral route as needed for 30 days.       lisinopril (ZESTRIL) 20 MG tablet lisinopril 20 mg tablet       methylPREDNISolone (MEDROL) 32 MG tablet Take 1 tablet (32mg) 12 hours and 2 hours prior to CT scan. 2 tablet 0     Multiple Vitamin (MULTI-VITAMINS) TABS Take 1 tablet by mouth daily       oxybutynin ER (DITROPAN-XL) 10 MG 24 hr tablet Take 10 mg by mouth       oxybutynin ER (DITROPAN-XL) 5 MG 24 hr tablet Take 1 tablet (5 mg) by mouth daily 30 tablet 0     potassium aminobenzoate 500 MG CAPS capsule Take by mouth 2 times daily       simvastatin (ZOCOR) 40 MG tablet simvastatin 40 mg tablet       traZODone (DESYREL) 50 MG tablet trazodone 50 mg tablet           ALLERGIES:  Allergies   Allergen Reactions     Contrast Dye Anaphylaxis     Diagnostic X-Ray Materials Shortness Of Breath     Naproxen Rash     Face broke out in a rash       Iodine      Seasonal Allergies Other (See Comments)     Sinus drainage         PAST MEDICAL HISTORY:  History  "reviewed. No pertinent past medical history.      PAST SURGICAL HISTORY:  History reviewed. No pertinent surgical history.      SOCIAL HISTORY:  Social History     Socioeconomic History     Marital status:      Spouse name: Not on file     Number of children: Not on file     Years of education: Not on file     Highest education level: Not on file   Occupational History     Not on file   Tobacco Use     Smoking status: Never Smoker     Smokeless tobacco: Never Used   Substance and Sexual Activity     Alcohol use: Not Currently     Drug use: Not on file     Sexual activity: Not on file   Other Topics Concern     Not on file   Social History Narrative     Not on file     Social Determinants of Health     Financial Resource Strain: Not on file   Food Insecurity: Not on file   Transportation Needs: Not on file   Physical Activity: Not on file   Stress: Not on file   Social Connections: Not on file   Intimate Partner Violence: Not on file   Housing Stability: Not on file         FAMILY HISTORY:  History reviewed. No pertinent family history.      PHYSICAL EXAM:  Vital signs:  /62   Pulse 78   Temp 97  F (36.1  C) (Tympanic)   Resp 16   Ht 1.588 m (5' 2.5\")   Wt 84.4 kg (186 lb)   SpO2 96%   BMI 33.48 kg/m     ECO-1.5  GENERAL/CONSTITUTIONAL: No acute distress.  EYES: Pupils are equal, round, and react to light and accommodation. Extraocular movements intact.  No scleral icterus.  ENT/MOUTH: Neck supple. Oropharynx clear, no mucositis.  LYMPH: No anterior cervical, posterior cervical, supraclavicular, axillary or inguinal adenopathy.   RESPIRATORY: Clear to auscultation bilaterally. No crackles or wheezing.   CARDIOVASCULAR: Regular rate and rhythm without murmurs, gallops, or rubs.  GASTROINTESTINAL: No hepatosplenomegaly, masses, or tenderness. The patient has normal bowel sounds. No guarding.  No distention.  MUSCULOSKELETAL: Warm and well-perfused, no cyanosis, clubbing, or edema.  NEUROLOGIC: " Cranial nerves II-XII are intact. Alert, oriented, answers questions appropriately.  INTEGUMENTARY: No rashes or jaundice.      LABS:  CBC RESULTS:   Recent Labs   Lab Test 22  0847   WBC 6.2   RBC 4.11   HGB 12.4   HCT 38.6   MCV 94   MCH 30.2   MCHC 32.1   RDW 12.5          Recent Labs   Lab Test 22  0847      POTASSIUM 4.4   CHLORIDE 107   CO2 26   ANIONGAP 6   *   BUN 24   CR 1.02   LANDY 9.6         PATHOLOGY:  Surgical pathology                        Surgical Pathology Report     Patient Name: GERARDO HEATH                    Accession #: [id number]   Med: Rec.: [id number]                               Phone Number: [phone]   : 1932 (Age: 89)                          Gender: F   Client: 10 - [facility] [employer]              Location: [facility])                                                     Taken: 2021                                                     Received: 2021                                                     Reported: 2021   Physician(s): GUERA BASS     ========================================================================   CLINICAL HISTORY   Malignant neoplasm of overlapping sites of bladder     OPERATIVE DIAGNOSES       Operation/Specimen: A: Urinary bladder, TURBT left anterior neck TUMOR       PATHOLOGICAL DIAGNOSIS:   A.  Urinary bladder, TURBT left anterior neck TUMOR:    . 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 See   comment    . Tumor invades into muscularis propria.     COMMENT   Sections show poorly differentiated carcinoma with basaloid features. No   definitive malignant surface component is present. Differential diagnosis   includes; urothelial carcinoma with basaloid features and basaloid squamous   cell carcinoma. It is hard to distinguish between these two differential   diagnosis based on morphologic and immunohistochemical features. Although we   favor that, this represent a  bladder primary tumor, the possibility of   secondary bladder involvement by tumors from other organs; mainly gynecologic   tract, should be clinically ruled out.              Final Diagnosis   Urine, cytology:                 Interpretation:                  Atypical Urothelial Cells.  See comment.                 Adequacy:                 Satisfactory for evaluation.         IMAGING:  CT Urogram 1/10/22:  IMPRESSION:  1. No B/L hydroureteronephrosis. Mild thickening of the wall of the urinary bladder, a nonspecific finding. No convincing evidence of metastatic disease in the abdomen/pelvis.  2. Sub-6 mm low-attenuation left renal lesion, progressed in size from CT 5/19/15. This is too small to characterize, statistically represents cyst.  3. Stable additional incidental findings.     CT CAP 5/4/22:                                                                 IMPRESSION:  1.  Mosaic attenuation pattern of the lungs is nonspecific but may  indicate small vessel or small airways disease.  2.  There are a few scattered pulmonary nodules measuring less than 4  mm. No prior chest CT available for comparison. Attention at  follow-up.  3.  No evidence of metastatic malignancy in the abdomen or pelvis.     ASSESSMENT/PLAN:  Shania Salazar is an 89 year old female who is referred for muscle invasive bladder cancer. Past medical history is significant for HTN, HLD, GERD, cognitive decline, and arthritis.     1) Muscle-invasive urothelial carcinoma  -Originally diagnosed in November 2020 (non-muscle invasive, but high-risk) s/p BCG x6.  -Recurrent, muscle-invasive disease diagnosed in November-December 2021.  -Patient has been deemed a poor surgical candidate for cystectomy, to which I am in agreement. Furthermore, patient and daughter would elect against cystectomy.  -We discussed superior locoregional DFS (absolute difference of 12% at 2 years favoring chemoRT). The 5 year overall survival rates are approximately 48%  with chemoRT and 35% with radiation alone (N Engl J Med 2012; 366:3568-1026 DOI: 10.1056/RNGYad0494959).  -We reviewed treatment with mitomycin and fluorouracil:     28-45 day cycle for 1 cycle with concurrent RT  -Mitomycin 12 mg/m2 IV Day 1  -Fluorouracil 500 mg/m2 IV continuous over 24 hours Days 1-5 and 22-26     -Patient and daughter inform me they have come to the conclusion they will not move forward with systemic chemotherapy. They have significant reservation over toxicity and prize quality of life given the fact that patient is still quite active with [employer] and gardening. I discussed this regimen is typically well-tolerated, but side effects due include marrow suppression with increased risk of neutropenia/infection, blood transfusion, diarrhea, nausea/vomiting, cardiac events.   -Initially, patient stating she would not be willing to move forward with radiation alone, but I have asked if she would be willing to reschedule with Radiation Oncology as she has not had evaluation to make a fully informed decision. Patient continues to be open to discussion with Radiation Oncology. She canceled her visit previously as this was set for [facility]. Referral placed for [facility].     2) Follow up in 2-3 weeks to discuss further.        Thank you for referring Christiana Salazar to clinic. Adequate time was dedicated to patient questions and answered to the expressed satisfaction of the patient and family members.            Florina Edgar DO  Hematology/Oncology  [facility]              Again, thank you for allowing me to participate in the care of your patient.        Sincerely,        Florina Edgar DO

## 2022-05-09 NOTE — PATIENT INSTRUCTIONS
-Referral made to [facility]- Radiation Oncology.  -Follow up with Dr. Edgar in clinic in 2-3 weeks to discuss her decision on radiation.

## 2022-05-12 DIAGNOSIS — N39.41 URGE INCONTINENCE OF URINE: ICD-10-CM

## 2022-05-12 NOTE — TELEPHONE ENCOUNTER
Pending Prescriptions:                       Disp   Refills    oxybutynin ER (DITROPAN XL) 5 MG 24 hr ta*30 tab*0            Sig: Take 1 tablet (5 mg) by mouth daily    Last Written Prescription Date: 4/14/22  Last Fill Quantity: 30,   # refills: 0  Last Office Visit: 4/14/22  Future Office visit: N/A       Routing refill request to provider.     Kedar Stiles, RN, BSN.  RN Care Coordinator     [facility]   [phone]

## 2022-05-16 RX ORDER — OXYBUTYNIN CHLORIDE 5 MG/1
5 TABLET, EXTENDED RELEASE ORAL DAILY
Qty: 30 TABLET | Refills: 0 | Status: SHIPPED | OUTPATIENT
Start: 2022-05-16 | End: 2022-09-20 | Stop reason: ALTCHOICE

## 2022-05-25 ENCOUNTER — PATIENT OUTREACH (OUTPATIENT)
Dept: CARE COORDINATION | Facility: CLINIC | Age: 87
End: 2022-05-25

## 2022-05-25 ENCOUNTER — ONCOLOGY VISIT (OUTPATIENT)
Dept: ONCOLOGY | Facility: CLINIC | Age: 87
End: 2022-05-25
Attending: INTERNAL MEDICINE
Payer: MEDICARE

## 2022-05-25 VITALS
TEMPERATURE: 97 F | WEIGHT: 185.7 LBS | DIASTOLIC BLOOD PRESSURE: 62 MMHG | HEIGHT: 63 IN | BODY MASS INDEX: 32.9 KG/M2 | OXYGEN SATURATION: 95 % | SYSTOLIC BLOOD PRESSURE: 109 MMHG | HEART RATE: 82 BPM

## 2022-05-25 DIAGNOSIS — C67.9 MALIGNANT NEOPLASM OF URINARY BLADDER, UNSPECIFIED SITE (H): ICD-10-CM

## 2022-05-25 PROCEDURE — G0463 HOSPITAL OUTPT CLINIC VISIT: HCPCS

## 2022-05-25 PROCEDURE — 99215 OFFICE O/P EST HI 40 MIN: CPT | Performed by: INTERNAL MEDICINE

## 2022-05-25 ASSESSMENT — PAIN SCALES - GENERAL: PAINLEVEL: NO PAIN (0)

## 2022-05-25 NOTE — PROGRESS NOTES
This clinician received referral from Dr. Edgar with request for SW engagement regarding supply needs (briefs etc) for in-home.     This clinician called and LVM for daughter, Rachelle, encouraging her to outreach to Christiana's PCP for orders and to explore coverage for needed supplies. Depending upon health insurance coverage and supply needs there may/may not be covered under health insurance.     This clinician left contact information to be available as needed for ongoing support.     BERNARD Heart, LICSW  Phone: [phone]  [facility]: M, Thu  *every other Thu, 8am-4:30pm  [facility]: W, F, *every other Tue, 8am-4:30pm

## 2022-05-25 NOTE — PROGRESS NOTES
"[facility]    Hematology/Oncology Established Patient Follow-up Note    Treatment Summary:      Today's Date: 5/25/22    Reason for Follow-up: Muscle invasive bladder cancer  Referring Provider: Trinity Sullivan MD      HISTORY OF PRESENT ILLNESS: Christiana Salazar is an 89 year old female who is referred for muscle invasive bladder cancer. Past medical history is significant for HTN, HLD, GERD, cognitive decline, and arthritis.     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 Donovan at [place]. She received induction BCG x 6 per her daughter who accompanies her today with complete response but no maintenance treatment was offered. Surveillance cystoscopy in November of 2021 showed evidence of recurrence for which she underwent TURBT on 12/17/2021 by Dr. Orlin Humphery:      \"TURBT checklist  1) Number of tumors: 1  2) Size of largest tumor: 2 cm  3) Characteristic of tumor: papillary with some sloughing debris, superficial  4) Recurrent vs. Primary: primary  5) Presence of CIS: No  6) Bimanual exam under anesthesia: no  7) Visually complete resection: yes  9) Visualization of detrusor muscle in resection base: no  10) Visual evaluation of perforation: no\"     Pathology revealed poorly differentiated tumor with basaloid features with extensive involvement of detrusor muscle. She had a restaging CT urogram done on 1/4/2022 with no clear evidence of metastatic disease.      Patient lives in her own apartment at a nursing care facility. She is able to perform most ADLs without significant issue. She will meet with Rad Onc in early May 2022. She denies weight loss, fever, or evidence of hematuria.      INTERIM HISTORY:  Referred to Radiology downtown. Patient and daughter stated they arrived in the parking structure and decided against meeting with the team as they were " overwhelmed and did not want to receive therapy downtown. No complaints of hematuria currently. No pain, weight loss.    Patient and daughter again discussed and she did not end up meeting with radiation oncology with rescheduling to [facility].       REVIEW OF SYSTEMS:   A 14 point ROS was reviewed with pertinent positives and negatives in the HPI.       HOME MEDICATIONS:  Current Outpatient Medications   Medication Sig Dispense Refill     albuterol (PROVENTIL) (2.5 MG/3ML) 0.083% neb solution Inhale 2.5 mg into the lungs       Docusate Sodium (COLACE PO) Take by mouth 2 times daily       fluticasone (FLONASE) 50 MCG/ACT nasal spray 1 spray       furosemide (LASIX) 40 MG tablet Take 20 mg by mouth every other day       HYDROcodone-acetaminophen (NORCO) 5-325 MG tablet hydrocodone 5 mg-acetaminophen 325 mg tablet   Take 1 tablet 3 times a day by oral route as needed for 30 days.       lisinopril (ZESTRIL) 20 MG tablet lisinopril 20 mg tablet       methylPREDNISolone (MEDROL) 32 MG tablet Take 1 tablet (32mg) 12 hours and 2 hours prior to CT scan. 2 tablet 0     Multiple Vitamin (MULTI-VITAMINS) TABS Take 1 tablet by mouth daily       oxybutynin ER (DITROPAN XL) 5 MG 24 hr tablet Take 1 tablet (5 mg) by mouth daily 30 tablet 0     oxybutynin ER (DITROPAN-XL) 10 MG 24 hr tablet Take 10 mg by mouth       potassium aminobenzoate 500 MG CAPS capsule Take by mouth 2 times daily       simvastatin (ZOCOR) 40 MG tablet simvastatin 40 mg tablet       traZODone (DESYREL) 50 MG tablet trazodone 50 mg tablet           ALLERGIES:  Allergies   Allergen Reactions     Contrast Dye Anaphylaxis     Diagnostic X-Ray Materials Shortness Of Breath     Naproxen Rash     Face broke out in a rash       Iodine      Seasonal Allergies Other (See Comments)     Sinus drainage         PAST MEDICAL HISTORY:  No past medical history on file.      PAST SURGICAL HISTORY:  No past surgical history on file.      SOCIAL HISTORY:  Social History  "    Socioeconomic History     Marital status:      Spouse name: Not on file     Number of children: Not on file     Years of education: Not on file     Highest education level: Not on file   Occupational History     Not on file   Tobacco Use     Smoking status: Never Smoker     Smokeless tobacco: Never Used   Substance and Sexual Activity     Alcohol use: Not Currently     Drug use: Not on file     Sexual activity: Not on file   Other Topics Concern     Not on file   Social History Narrative     Not on file     Social Determinants of Health     Financial Resource Strain: Not on file   Food Insecurity: Not on file   Transportation Needs: Not on file   Physical Activity: Not on file   Stress: Not on file   Social Connections: Not on file   Intimate Partner Violence: Not on file   Housing Stability: Not on file         FAMILY HISTORY:  No family history on file.      PHYSICAL EXAM:  Vital signs:  /62   Pulse 82   Temp 97  F (36.1  C) (Tympanic)   Ht 1.588 m (5' 2.5\")   Wt 84.2 kg (185 lb 11.2 oz)   SpO2 95%   BMI 33.42 kg/m     ECO.5  GENERAL/CONSTITUTIONAL: No acute distress.  EYES: Pupils are equal, round, and react to light and accommodation. Extraocular movements intact.  No scleral icterus.  ENT/MOUTH: Neck supple. Oropharynx clear, no mucositis.  LYMPH: No anterior cervical, posterior cervical, supraclavicular, axillary or inguinal adenopathy.   RESPIRATORY: Clear to auscultation bilaterally. No crackles or wheezing.   CARDIOVASCULAR: Regular rate and rhythm without murmurs, gallops, or rubs.  GASTROINTESTINAL: No hepatosplenomegaly, masses, or tenderness. The patient has normal bowel sounds. No guarding.  No distention.  MUSCULOSKELETAL: Warm and well-perfused, no cyanosis, clubbing, or edema.  NEUROLOGIC: Cranial nerves II-XII are intact. Alert, oriented, answers questions appropriately.  INTEGUMENTARY: No rashes or jaundice.         LABS:  CBC RESULTS:   Recent Labs   Lab Test " 22  0847   WBC 6.2   RBC 4.11   HGB 12.4   HCT 38.6   MCV 94   MCH 30.2   MCHC 32.1   RDW 12.5          Recent Labs   Lab Test 22  0847      POTASSIUM 4.4   CHLORIDE 107   CO2 26   ANIONGAP 6   *   BUN 24   CR 1.02   LANDY 9.6       PATHOLOGY:  Surgical pathology                        Surgical Pathology Report     Patient Name: GERARDO HEATH                    Accession #: [id number]   Med: Rec.: [id number]                               Phone Number: [phone]   : 1932 (Age: 89)                          Gender: F   Client: 10  [address] [employer]              Location: [facility]                                                     Taken: 2021                                                     Received: 2021                                                     Reported: 2021   Physician(s): GUERA BASS     ========================================================================   CLINICAL HISTORY   Malignant neoplasm of overlapping sites of bladder     OPERATIVE DIAGNOSES       Operation/Specimen: A: Urinary bladder, TURBT left anterior neck TUMOR       PATHOLOGICAL DIAGNOSIS:   A.  Urinary bladder, TURBT left anterior neck TUMOR:    . 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 See   comment    . Tumor invades into muscularis propria.     COMMENT   Sections show poorly differentiated carcinoma with basaloid features. No   definitive malignant surface component is present. Differential diagnosis   includes; urothelial carcinoma with basaloid features and basaloid squamous   cell carcinoma. It is hard to distinguish between these two differential   diagnosis based on morphologic and immunohistochemical features. Although we   favor that, this represent a bladder primary tumor, the possibility of   secondary bladder involvement by tumors from other organs; mainly gynecologic   tract, should be clinically ruled out.               Final Diagnosis   Urine, cytology:                 Interpretation:                  Atypical Urothelial Cells.  See comment.                 Adequacy:                 Satisfactory for evaluation.         IMAGING:  CT Urogram 1/10/22:  IMPRESSION:  1. No B/L hydroureteronephrosis. Mild thickening of the wall of the urinary bladder, a nonspecific finding. No convincing evidence of metastatic disease in the abdomen/pelvis.  2. Sub-6 mm low-attenuation left renal lesion, progressed in size from CT 5/19/15. This is too small to characterize, statistically represents cyst.  3. Stable additional incidental findings.      CT CAP 5/4/22:                                                                 IMPRESSION:  1.  Mosaic attenuation pattern of the lungs is nonspecific but may  indicate small vessel or small airways disease.  2.  There are a few scattered pulmonary nodules measuring less than 4  mm. No prior chest CT available for comparison. Attention at  follow-up.  3.  No evidence of metastatic malignancy in the abdomen or pelvis.       ASSESSMENT/PLAN:  Shania Salazar is an 89 year old female who is referred for muscle invasive bladder cancer. Past medical history is significant for HTN, HLD, GERD, cognitive decline, and arthritis.     1) Muscle-invasive urothelial carcinoma  -Originally diagnosed in November 2020 (non-muscle invasive, but high-risk) s/p BCG x6.  -Recurrent, muscle-invasive disease diagnosed in November-December 2021.  -Patient has been deemed a poor surgical candidate for cystectomy, to which I am in agreement. Furthermore, patient and daughter would elect against cystectomy.  -We discussed superior locoregional DFS (absolute difference of 12% at 2 years favoring chemoRT). The 5 year overall survival rates are approximately 48% with chemoRT and 35% with radiation alone (N Engl J Med 2012; 366:9493-4687 DOI: 10.1056/LFOHqx7256130).  -We reviewed treatment with mitomycin and  fluorouracil:     28-45 day cycle for 1 cycle with concurrent RT  -Mitomycin 12 mg/m2 IV Day 1  -Fluorouracil 500 mg/m2 IV continuous over 24 hours Days 1-5 and 22-26     -Patient and daughter inform me they have come to the conclusion they will not move forward with systemic chemotherapy. They have significant reservation over toxicity and prize quality of life given the fact that patient is still quite active with crocheting and gardening. I discussed this regimen is typically well-tolerated, but side effects due include marrow suppression with increased risk of neutropenia/infection, blood transfusion, diarrhea, nausea/vomiting, cardiac events.   -Initially, patient stating she would not be willing to move forward with radiation alone, but I have asked if she would be willing to reschedule with Radiation Oncology as she has not had evaluation to make a fully informed decision. Patient continues to be open to discussion with Radiation Oncology. She canceled her visit previously as this was set for [facility]. Referral placed for [facility]O.  -5/25/22: Patient did not end up meeting with MRO. She and daughter have discussed again and patient does not wish to proceed with chemoradiation or radiation at this time valuing her quality of life. She is to turn 89 in three weeks and a large birthday gathering is planned. They are open to meeting with palliative care at this time to assist in advanced care planning. Patient is asymptomatic at this time, but is likely to develop pain and fatigue as her underlying malignancy progresses. Patient may require follow up with Urology. They also wish to speak with social work in regards to supplies.     2) I have discussed this with Dr. Sullivan. Patient is welcome to follow up in clinic anytime with any concerns. We wish Christiana a Happy 89th Birthday and a peaceful transition.         Thank you for referring Christiana Salazar to clinic. Adequate time was dedicated to patient questions and  answered to the expressed satisfaction of the patient and family members.            Florina Edgar DO  Hematology/Oncology  [facility]

## 2022-05-25 NOTE — PATIENT INSTRUCTIONS
-Imaging reviewed.  -Patient has elected against chemotherapy or radiation therapy.  -She should follow up with Urology. - Urology appt scheduled on 6/29  -Palliative care referral made today. - Pt will be contacted by new patient schedulers for Palliative Care appointment  -Patient is welcome to follow up in clinic anytime with any concerns.

## 2022-05-25 NOTE — LETTER
"    5/25/2022         [facility]: Christiana Salazar  [id number] [facility] [address]        Dear Colleague,    Thank you for referring your patient, Christiana Salazar, to the [facility]. Please see a copy of my visit note below.    [facility]    Hematology/Oncology Established Patient Follow-up Note    Treatment Summary:      Today's Date: 5/25/22    Reason for Follow-up: Muscle invasive bladder cancer  Referring Provider: Trinity Sullivan MD      HISTORY OF PRESENT ILLNESS: Christiana Salazar is an 89 year old female who is referred for muscle invasive bladder cancer. Past medical history is significant for HTN, HLD, GERD, cognitive decline, and arthritis.     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 Donovan at [place]. She received induction BCG x 6 per her daughter who accompanies her today with complete response but no maintenance treatment was offered. Surveillance cystoscopy in November of 2021 showed evidence of recurrence for which she underwent TURBT on 12/17/2021 by Dr. Orlin Humphrey:      \"TURBT checklist  1) Number of tumors: 1  2) Size of largest tumor: 2 cm  3) Characteristic of tumor: papillary with some sloughing debris, superficial  4) Recurrent vs. Primary: primary  5) Presence of CIS: No  6) Bimanual exam under anesthesia: no  7) Visually complete resection: yes  9) Visualization of detrusor muscle in resection base: no  10) Visual evaluation of perforation: no\"     Pathology revealed poorly differentiated tumor with basaloid features with extensive involvement of detrusor muscle. She had a restaging CT urogram done on 1/4/2022 with no clear evidence of metastatic disease.      Patient lives in her own apartment at a nursing care facility. She is able to perform most ADLs without significant issue. She will meet with Rad " Onc in early May 2022. She denies weight loss, fever, or evidence of hematuria.      INTERIM HISTORY:  Referred to Radiology downtown. Patient and daughter stated they arrived in the parking structure and decided against meeting with the team as they were overwhelmed and did not want to receive therapy downtown. No complaints of hematuria currently. No pain, weight loss.    Patient and daughter again discussed and she did not end up meeting with radiation oncology with rescheduling to MRO.       REVIEW OF SYSTEMS:   A 14 point ROS was reviewed with pertinent positives and negatives in the HPI.       HOME MEDICATIONS:  Current Outpatient Medications   Medication Sig Dispense Refill     albuterol (PROVENTIL) (2.5 MG/3ML) 0.083% neb solution Inhale 2.5 mg into the lungs       Docusate Sodium (COLACE PO) Take by mouth 2 times daily       fluticasone (FLONASE) 50 MCG/ACT nasal spray 1 spray       furosemide (LASIX) 40 MG tablet Take 20 mg by mouth every other day       HYDROcodone-acetaminophen (NORCO) 5-325 MG tablet hydrocodone 5 mg-acetaminophen 325 mg tablet   Take 1 tablet 3 times a day by oral route as needed for 30 days.       lisinopril (ZESTRIL) 20 MG tablet lisinopril 20 mg tablet       methylPREDNISolone (MEDROL) 32 MG tablet Take 1 tablet (32mg) 12 hours and 2 hours prior to CT scan. 2 tablet 0     Multiple Vitamin (MULTI-VITAMINS) TABS Take 1 tablet by mouth daily       oxybutynin ER (DITROPAN XL) 5 MG 24 hr tablet Take 1 tablet (5 mg) by mouth daily 30 tablet 0     oxybutynin ER (DITROPAN-XL) 10 MG 24 hr tablet Take 10 mg by mouth       potassium aminobenzoate 500 MG CAPS capsule Take by mouth 2 times daily       simvastatin (ZOCOR) 40 MG tablet simvastatin 40 mg tablet       traZODone (DESYREL) 50 MG tablet trazodone 50 mg tablet           ALLERGIES:  Allergies   Allergen Reactions     Contrast Dye Anaphylaxis     Diagnostic X-Ray Materials Shortness Of Breath     Naproxen Rash     Face broke out in a  "rash       Iodine      Seasonal Allergies Other (See Comments)     Sinus drainage         PAST MEDICAL HISTORY:  No past medical history on file.      PAST SURGICAL HISTORY:  No past surgical history on file.      SOCIAL HISTORY:  Social History     Socioeconomic History     Marital status:      Spouse name: Not on file     Number of children: Not on file     Years of education: Not on file     Highest education level: Not on file   Occupational History     Not on file   Tobacco Use     Smoking status: Never Smoker     Smokeless tobacco: Never Used   Substance and Sexual Activity     Alcohol use: Not Currently     Drug use: Not on file     Sexual activity: Not on file   Other Topics Concern     Not on file   Social History Narrative     Not on file     Social Determinants of Health     Financial Resource Strain: Not on file   Food Insecurity: Not on file   Transportation Needs: Not on file   Physical Activity: Not on file   Stress: Not on file   Social Connections: Not on file   Intimate Partner Violence: Not on file   Housing Stability: Not on file         FAMILY HISTORY:  No family history on file.      PHYSICAL EXAM:  Vital signs:  /62   Pulse 82   Temp 97  F (36.1  C) (Tympanic)   Ht 1.588 m (5' 2.5\")   Wt 84.2 kg (185 lb 11.2 oz)   SpO2 95%   BMI 33.42 kg/m     ECO.5  GENERAL/CONSTITUTIONAL: No acute distress.  EYES: Pupils are equal, round, and react to light and accommodation. Extraocular movements intact.  No scleral icterus.  ENT/MOUTH: Neck supple. Oropharynx clear, no mucositis.  LYMPH: No anterior cervical, posterior cervical, supraclavicular, axillary or inguinal adenopathy.   RESPIRATORY: Clear to auscultation bilaterally. No crackles or wheezing.   CARDIOVASCULAR: Regular rate and rhythm without murmurs, gallops, or rubs.  GASTROINTESTINAL: No hepatosplenomegaly, masses, or tenderness. The patient has normal bowel sounds. No guarding.  No distention.  MUSCULOSKELETAL: Warm and " well-perfused, no cyanosis, clubbing, or edema.  NEUROLOGIC: Cranial nerves II-XII are intact. Alert, oriented, answers questions appropriately.  INTEGUMENTARY: No rashes or jaundice.         LABS:  CBC RESULTS:   Recent Labs   Lab Test 22  0847   WBC 6.2   RBC 4.11   HGB 12.4   HCT 38.6   MCV 94   MCH 30.2   MCHC 32.1   RDW 12.5          Recent Labs   Lab Test 22  0847      POTASSIUM 4.4   CHLORIDE 107   CO2 26   ANIONGAP 6   *   BUN 24   CR 1.02   LANDY 9.6       PATHOLOGY:  Surgical pathology                        Surgical Pathology Report     Patient Name: GERARDO HEATH                    Accession #: [id number]   Med: Rec.: [id number]                               Phone Number: [phone]   : 1932 (Age: 89)                          Gender: F   Client: [facility] - [facility] Outline              Location: [facility])                                                     Taken: 2021                                                     Received: 2021                                                     Reported: 2021   Physician(s): GUERA BASS     ========================================================================   CLINICAL HISTORY   Malignant neoplasm of overlapping sites of bladder     OPERATIVE DIAGNOSES       Operation/Specimen: A: Urinary bladder, TURBT left anterior neck TUMOR       PATHOLOGICAL DIAGNOSIS:   A.  Urinary bladder, TURBT left anterior neck TUMOR:    . 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 See   comment    . Tumor invades into muscularis propria.     COMMENT   Sections show poorly differentiated carcinoma with basaloid features. No   definitive malignant surface component is present. Differential diagnosis   includes; urothelial carcinoma with basaloid features and basaloid squamous   cell carcinoma. It is hard to distinguish between these two differential   diagnosis based on morphologic and  immunohistochemical features. Although we   favor that, this represent a bladder primary tumor, the possibility of   secondary bladder involvement by tumors from other organs; mainly gynecologic   tract, should be clinically ruled out.              Final Diagnosis   Urine, cytology:                 Interpretation:                  Atypical Urothelial Cells.  See comment.                 Adequacy:                 Satisfactory for evaluation.         IMAGING:  CT Urogram 1/10/22:  IMPRESSION:  1. No B/L hydroureteronephrosis. Mild thickening of the wall of the urinary bladder, a nonspecific finding. No convincing evidence of metastatic disease in the abdomen/pelvis.  2. Sub-6 mm low-attenuation left renal lesion, progressed in size from CT 5/19/15. This is too small to characterize, statistically represents cyst.  3. Stable additional incidental findings.      CT CAP 5/4/22:                                                                 IMPRESSION:  1.  Mosaic attenuation pattern of the lungs is nonspecific but may  indicate small vessel or small airways disease.  2.  There are a few scattered pulmonary nodules measuring less than 4  mm. No prior chest CT available for comparison. Attention at  follow-up.  3.  No evidence of metastatic malignancy in the abdomen or pelvis.       ASSESSMENT/PLAN:  Shania Salazar is an 89 year old female who is referred for muscle invasive bladder cancer. Past medical history is significant for HTN, HLD, GERD, cognitive decline, and arthritis.     1) Muscle-invasive urothelial carcinoma  -Originally diagnosed in November 2020 (non-muscle invasive, but high-risk) s/p BCG x6.  -Recurrent, muscle-invasive disease diagnosed in November-December 2021.  -Patient has been deemed a poor surgical candidate for cystectomy, to which I am in agreement. Furthermore, patient and daughter would elect against cystectomy.  -We discussed superior locoregional DFS (absolute difference of 12% at 2 years  favoring chemoRT). The 5 year overall survival rates are approximately 48% with chemoRT and 35% with radiation alone (N Engl J Med 2012; 366:4429-1161 DOI: 10.1056/XLCEol2799986).  -We reviewed treatment with mitomycin and fluorouracil:     28-45 day cycle for 1 cycle with concurrent RT  -Mitomycin 12 mg/m2 IV Day 1  -Fluorouracil 500 mg/m2 IV continuous over 24 hours Days 1-5 and 22-26     -Patient and daughter inform me they have come to the conclusion they will not move forward with systemic chemotherapy. They have significant reservation over toxicity and prize quality of life given the fact that patient is still quite active with crocheting and gardening. I discussed this regimen is typically well-tolerated, but side effects due include marrow suppression with increased risk of neutropenia/infection, blood transfusion, diarrhea, nausea/vomiting, cardiac events.   -Initially, patient stating she would not be willing to move forward with radiation alone, but I have asked if she would be willing to reschedule with Radiation Oncology as she has not had evaluation to make a fully informed decision. Patient continues to be open to discussion with Radiation Oncology. She canceled her visit previously as this was set for [facility]. Referral placed for MRO.  -5/25/22: Patient did not end up meeting with MRO. She and daughter have discussed again and patient does not wish to proceed with chemoradiation or radiation at this time valuing her quality of life. She is to turn 89 in three weeks and a large birthday gathering is planned. They are open to meeting with palliative care at this time to assist in advanced care planning. Patient is asymptomatic at this time, but is likely to develop pain and fatigue as her underlying malignancy progresses. Patient may require follow up with Urology. They also wish to speak with social work in regards to supplies.     2) I have discussed this with Dr. Sullivan. Patient is welcome to  follow up in clinic anytime with any concerns. We wish Christiana a Happy 89th Birthday and a peaceful transition.         Thank you for referring Christiana Salazar to clinic. Adequate time was dedicated to patient questions and answered to the expressed satisfaction of the patient and family members.            Florina Edgar DO  Hematology/Oncology  [facility]          Again, thank you for allowing me to participate in the care of your patient.        Sincerely,        Florina Edgar DO

## 2022-05-25 NOTE — NURSING NOTE
"Oncology Rooming Note    May 25, 2022 11:05 AM   Christiana Salazar is a 89 year old female who presents for:    Chief Complaint   Patient presents with     Oncology Clinic Visit     Malignant neoplasm of urinary bladder, unspecified site      Initial Vitals: /62   Pulse 82   Temp 97  F (36.1  C) (Tympanic)   Ht 1.588 m (5' 2.5\")   Wt 84.2 kg (185 lb 11.2 oz)   SpO2 95%   BMI 33.42 kg/m   Estimated body mass index is 33.42 kg/m  as calculated from the following:    Height as of this encounter: 1.588 m (5' 2.5\").    Weight as of this encounter: 84.2 kg (185 lb 11.2 oz). Body surface area is 1.93 meters squared.  No Pain (0) Comment: Data Unavailable   No LMP recorded.  Allergies reviewed: Yes  Medications reviewed: Yes    Medications: Medication refills not needed today.  Pharmacy name entered into [employer]: [facility] [id number] [place] [id number] [facility] [address]    Clinical concerns: follow up       Lucille Nogueira CMA              "

## 2022-06-13 ENCOUNTER — TELEPHONE (OUTPATIENT)
Dept: UROLOGY | Facility: CLINIC | Age: 87
End: 2022-06-13
Payer: MEDICARE

## 2022-06-13 DIAGNOSIS — C67.9 MALIGNANT NEOPLASM OF URINARY BLADDER, UNSPECIFIED SITE (H): Primary | ICD-10-CM

## 2022-06-13 PROCEDURE — 99207 PR NO CHARGE LOS: CPT | Performed by: INTERNAL MEDICINE

## 2022-06-13 RX ORDER — HYDROCODONE BITARTRATE AND ACETAMINOPHEN 5; 325 MG/1; MG/1
1 TABLET ORAL EVERY 6 HOURS PRN
Qty: 100 TABLET | Refills: 0 | Status: SHIPPED | OUTPATIENT
Start: 2022-06-13 | End: 2022-07-13

## 2022-06-13 NOTE — TELEPHONE ENCOUNTER
Writer spoke with Rachelle patient daughter that Dr. Edgar will be prescribing up until palliative consult pain medication. Patient and daughter had no further questions or concerns at this time.     Prescription sent to local pharmacy for pickup.     Kedar Stiles, RN, BSN.  RN Care Coordinator     [facility]   [phone]

## 2022-06-13 NOTE — TELEPHONE ENCOUNTER
[facility] Call Center    Phone Message    May a detailed message be left on voicemail: yes     Reason for Call: Per Rachelle, patient has bladder cancer and has decided at her age that she no longer wants to have treatment.  Patient will be set up for pelative care soon.  They have also canceled botox treatments with Dr. Mabry.  Rachelle has questions in regards to HYDROcodone-acetaminophen (NORCO) 5-325 MG tablet [93068] (Order [id number]).  Per Rachelle, this medication was original prescribed by patient  In [place].  They saw a  here in [place] and that provider would only prescribe a med refill for one time.  Patient will be out in 3 days. Rachelle would like to know what Dr. Sullivan recommends in regards to getting that med that patient needs that she takes for her back surgery.  Patient has been on this medication for years. Please reach out to Rachelle.    Action Taken: Message routed to:  [facility] ([facility] Urology    Travel Screening: Not Applicable

## 2022-06-13 NOTE — TELEPHONE ENCOUNTER
Writer spoke with Dr. Sullivan regarding medication refill. Dr. Sullivan recommending patient meet with PCP for pain and discuss new pain management plan until palliative visit in 2 months. Writer educated Rachelle hawkins daughter of this request and if having troubles to update team again with concerns. She is in agreement with plan and had no further questions or concerns.     Kedar Stiles, RN, BSN.  RN Care Coordinator     [facility]   [phone]

## 2022-08-15 NOTE — PROGRESS NOTES
Palliative Care Clinic Consult Note    Patient Name: Christiana Salazar  Primary Provider: Mery Vizcarra    Chief Complaint/Patient ID: 89 yo F with Hx of muscle invasive bladder cancer. Dx 11/2020, s/p induction BCG x6 with CR and no maintenance tx offered. Evidence of recurrence 11/2021, s/p TURBT with no evidence of metastatic dz. She ultimately elected to not pursue systemic chemo or radiation therapy. Additional Past medical history is significant for HTN, HLD, GERD, cognitive decline, and arthritis (longstanding hx of chronic LBP with hx of injections).     Reviewed: Yes, Rxs for Norco.    History of Present Illness:  Christiana Salazar is a 89 year old female who is seen for a new consult visit today with Palliative Care.  She is here today with her daughter Rachelle who provides additional history.     She confirms today that she would not want to go through any additional systemic therapy or radiation therapy for her cancer.  She had previously been told that she may not tolerate it well given her age, and she agrees with this.  She does not want to do anything that would make her feel worse.  She was able to enjoy her 89th birthday party in the summer, which she appreciated. She notes that even if things were getting worse with her tumor, she still would want to consider systemic therapy, however she is interested in surveillance of her tumor for prognostic and planning purposes.     She has been having some increased pain in her lower back as well as her pelvis/groin area.  She notes the groin pain is new since her last imaging in May.  She describes this sensation as sharp and nonradiating.  She additionally has been on Norco for 10 years for chronicback pain and has a Hx of steroid injections into low back in the past.  These have not been done in several years.  In general, the Norco tablets help keep her pain under fairly decent control (only takes 2/day unless things are really bad.  With the recent  "increase in pain over the last couple weeks, they have added in more Tylenol (2 to 4 tablets/day of the 500 mg strength dose), and this also seems to be a good addition to her regimen.  She does have some arthritis pain in her hands which limits her from doing some of the activities she enjoys such as crocheting and quilting.    She does struggle with incontinence and wears several pads every day.  This is sometimes a limiting factor for her socially.  She does go down for meals at her living facility.  She does not feel like she is particularly depressed.  She accepts that there are things that you \"cannot do anymore, even if you want to.  I make do\".    Social History:  Recently moved to [place] from [place].  Had 5 children, however unfortunately lost 2 of her sons suddenly within 6 months of each other in 2021.  Currently [facility] at the [facility].  Was raised J[employer] and was very involved in the Denominational.  Social History     Tobacco Use     Smoking status: Never Smoker     Smokeless tobacco: Never Used   Substance Use Topics     Alcohol use: Not Currently     Family History- Reviewed in Epic.     Allergies   Allergen Reactions     Contrast Dye Anaphylaxis     Diagnostic X-Ray Materials Shortness Of Breath     Naproxen Rash     Face broke out in a rash       Iodine      Seasonal Allergies Other (See Comments)     Sinus drainage     Advanced Care Planning: There is a legal power of  document on file naming daughter. No HCD currently available to us.    Medications- Reviewed in Epic.    Past Medical History- Reviewed in [employer].    Past Surgical History- Reviewed in Epic.    Review of Systems:   ROS: 10 point ROS neg other than the symptoms noted above in the HPI.    Physical Exam:   /65   Pulse 71   Resp 16   Wt 82.6 kg (182 lb)   SpO2 97%   BMI 32.76 kg/m    Constitutional: Alert, pleasant, no apparent distress. Sitting up in chair.  Eyes: Sclera non-icteric, no eye discharge.  ENT: No nasal " discharge. Ears grossly normal.  Respiratory: Unlabored respirations. Speaking in full sentences.  Musculoskeletal: Extremities appear normal- no gross deformities noted. No edema noted on upper body.   Skin: No suspicious lesions or rashes on visible skin.  Neurologic: Clear speech, no aphasia. No facial droop.  Psychiatric: Mentation appears slowed,  struggles with short-term memory.  Appropriate attention. Affect normal/bright. Does not appear anxious or depressed.      Key Data Reviewed:  LABS: 4/21/22- Cr 1.02, GFR 52, Albumin 3.7, LFTs wnl. WBC 6.2, Hgb 12.4, Plts 318.     IMAGING: CT CAP 5/4/22- IMPRESSION:  1.  Mosaic attenuation pattern of the lungs is nonspecific but may indicate small vessel or small airways disease.  2.  There are a few scattered pulmonary nodules measuring less than 4 mm. No prior chest CT available for comparison. Attention at follow-up.  3.  No evidence of metastatic malignancy in the abdomen or pelvis.    Opioid Risk Tool (ORT):    Family History of Substance Abuse:        Alcohol = 0 pt (no)       Illegal Drugs = 0 pt (no)       Prescription Drugs = 0 pt (no)      Personal History of Substance Abuse:       Alcohol = 0 pt (no)       Illegal Drugs = 0 pt (no)       Prescription Drugs = 0 pt (no)        Age: 0 pt (age < 16; age > 45)      History of Pre-adolescent Sexual Abuse: 0 pt (no)      Psychological Disease: 0 pt (none)      ORT Score = 0        0-3: Low risk for opioid abuse       4-7: Moderate risk for opioid abuse       >/= 8: High risk for opioid abuse        Impression & Recommendations & Counseling:  Christiana Salazar is a 89 year old female with history of muscle invasive bladder cancer.    Pain - She does have a history of chronic pain in her back that was treated previously with injections, and she has been on Norco for about 10 years.  She has started having an acute worsening of the back pain as well as some newer groin/pelvic pain.  Although her last set of scans in  May showed no metastatic disease, this change in pain may be due to her bladder cancer.  She additionally has some arthritic pain in her hands and some other joints.  -Okay to continue Norco 1 tablet 2-3 times daily.  Generally takes 1 tablet twice daily.  Palliative care can take over prescribing this, as I do suspect she is or will be having more symptoms/pain from her bladder cancer.  ORT score of 0.  Refill sent today.  -Recommend continuing Tylenol up to 2000 mg daily.  Discussed being careful of additional doses with the Norco.  -Recommended trial of topical NSAID like Aspercreme for her hand pain.  She can use this up to 4 times daily.    Bladder cancer  Goals of care - Patient and daughter both confirm that she does not want to go through additional systemic therapy due to the risk of affecting her quality of life.  Because she has had some change in her symptoms, they are interested in follow-up with oncology for surveillance purposes.  They know that her scan in May had shown no evidence of metastatic disease.  They are wondering about the feasibility of having an additional scan performed for assistance with prognostic/planning purposes.  I provided the phone number for the oncology care coordinator so that they could reach out regarding setting up a follow-up visit sometime this fall.    Due to time constraints in this visit, we did not get into an in-depth advance care planning conversation.  There is no healthcare directive on file, and I would strongly recommend a conversation regarding CPR and intubation.  We will recommend having this at follow-up.    Additionally, she did seem to have some clear cognitive deficits today.  Daughter had to fill in the blanks about several things including symptoms and medication management.  While she states she is engaging with other residents of her facility, I do worry about her social situation and the fact that she is not established with a [employer] in [place]  (incontinence has been a limiting factor as she has been worried about not knowing when she need to go to the bathroom when in [place]).  Discussed that there are 's available through the oncology clinic and she can have a phone visit with one of them at any point in time.      Follow up: We will plan follow-up in January, however if they need something beforehand they can reach out.         Total time spent on day of encounter is 78 mins, including reviewing record, review of above studies, above visit with patient (FTF 12:42 PM-1:36 PM), symptomatic discussion of pain, including medication adjustments/prescription management, and documentation.     Yaritza Boggs,   Palliative Medicine   Pager [phone], AMCOM ID [id number]    Some chart documentation performed using Dragon Voice recognition Software. Although reviewed after completion, some words and grammatical errors may remain.

## 2022-08-18 ENCOUNTER — ONCOLOGY VISIT (OUTPATIENT)
Dept: ONCOLOGY | Facility: CLINIC | Age: 87
End: 2022-08-18
Attending: INTERNAL MEDICINE
Payer: MEDICARE

## 2022-08-18 ENCOUNTER — PRE VISIT (OUTPATIENT)
Dept: ONCOLOGY | Facility: CLINIC | Age: 87
End: 2022-08-18

## 2022-08-18 VITALS
HEART RATE: 71 BPM | WEIGHT: 182 LBS | DIASTOLIC BLOOD PRESSURE: 65 MMHG | SYSTOLIC BLOOD PRESSURE: 106 MMHG | RESPIRATION RATE: 16 BRPM | OXYGEN SATURATION: 97 % | BODY MASS INDEX: 32.76 KG/M2

## 2022-08-18 DIAGNOSIS — M19.90 ARTHRITIS: ICD-10-CM

## 2022-08-18 DIAGNOSIS — G89.29 CHRONIC MIDLINE LOW BACK PAIN WITHOUT SCIATICA: ICD-10-CM

## 2022-08-18 DIAGNOSIS — C67.9 MALIGNANT NEOPLASM OF URINARY BLADDER, UNSPECIFIED SITE (H): Primary | ICD-10-CM

## 2022-08-18 DIAGNOSIS — Z71.89 GOALS OF CARE, COUNSELING/DISCUSSION: ICD-10-CM

## 2022-08-18 DIAGNOSIS — G89.3 CANCER RELATED PAIN: ICD-10-CM

## 2022-08-18 DIAGNOSIS — Z51.5 ENCOUNTER FOR PALLIATIVE CARE: ICD-10-CM

## 2022-08-18 DIAGNOSIS — M54.50 CHRONIC MIDLINE LOW BACK PAIN WITHOUT SCIATICA: ICD-10-CM

## 2022-08-18 PROCEDURE — 99205 OFFICE O/P NEW HI 60 MIN: CPT | Performed by: STUDENT IN AN ORGANIZED HEALTH CARE EDUCATION/TRAINING PROGRAM

## 2022-08-18 PROCEDURE — G0463 HOSPITAL OUTPT CLINIC VISIT: HCPCS

## 2022-08-18 RX ORDER — HYDROCODONE BITARTRATE AND ACETAMINOPHEN 5; 325 MG/1; MG/1
1 TABLET ORAL 3 TIMES DAILY PRN
Qty: 90 TABLET | Refills: 0 | Status: SHIPPED | OUTPATIENT
Start: 2022-08-18 | End: 2022-10-27

## 2022-08-18 ASSESSMENT — PAIN SCALES - GENERAL: PAINLEVEL: NO PAIN (0)

## 2022-08-18 NOTE — PROGRESS NOTES
"        Oncology Rooming Note    August 18, 2022 12:33 PM   Christiana Salazar is a 89 year old female who presents for:    Chief Complaint   Patient presents with     Oncology Clinic Visit     Initial Vitals: /65   Pulse 71   Resp 16   Wt 82.6 kg (182 lb)   SpO2 97%   BMI 32.76 kg/m   Estimated body mass index is 32.76 kg/m  as calculated from the following:    Height as of 5/25/22: 1.588 m (5' 2.5\").    Weight as of this encounter: 82.6 kg (182 lb). Body surface area is 1.91 meters squared.  No Pain (0) Comment: Data Unavailable   No LMP recorded.  Allergies reviewed: Yes  Medications reviewed: Yes    Medications: Medication refills not needed today.  Pharmacy name entered into [employer]: [id number] [place] [address] [address]    Clinical concerns:  doctor was notified.      Laurence Cochran, [facility]            "

## 2022-08-18 NOTE — LETTER
8/18/2022         RE: Christiana Salazar  [address] [address] [facility] [address]        Dear Colleague,    Thank you for referring your patient, Christiana Salazar, to the [facility] [place]. Please see a copy of my visit note below.    Palliative Care Clinic Consult Note    Patient Name: Christiana Salazar  Primary Provider: Mery Vizcarra    Chief Complaint/Patient ID: 89 yo F with Hx of muscle invasive bladder cancer. Dx 11/2020, s/p induction BCG x6 with CR and no maintenance tx offered. Evidence of recurrence 11/2021, s/p TURBT with no evidence of metastatic dz. She ultimately elected to not pursue systemic chemo or radiation therapy. Additional Past medical history is significant for HTN, HLD, GERD, cognitive decline, and arthritis (longstanding hx of chronic LBP with hx of injections).     Reviewed: Yes, Rxs for Norco.    History of Present Illness:  Christiana Salazar is a 89 year old female who is seen for a new consult visit today with Palliative Care.  She is here today with her daughter Rachelle who provides additional history.     She confirms today that she would not want to go through any additional systemic therapy or radiation therapy for her cancer.  She had previously been told that she may not tolerate it well given her age, and she agrees with this.  She does not want to do anything that would make her feel worse.  She was able to enjoy her 89th birthday party in the summer, which she appreciated. She notes that even if things were getting worse with her tumor, she still would want to consider systemic therapy, however she is interested in surveillance of her tumor for prognostic and planning purposes.     She has been having some increased pain in her lower back as well as her pelvis/groin area.  She notes the groin pain is new since her last imaging in May.  She describes this sensation as sharp and nonradiating.  She additionally has been on Norco for 10 years for chronicback pain  "and has a Hx of steroid injections into low back in the past.  These have not been done in several years.  In general, the Norco tablets help keep her pain under fairly decent control (only takes 2/day unless things are really bad.  With the recent increase in pain over the last couple weeks, they have added in more Tylenol (2 to 4 tablets/day of the 500 mg strength dose), and this also seems to be a good addition to her regimen.  She does have some arthritis pain in her hands which limits her from doing some of the activities she enjoys such as crocheting and quilting.    She does struggle with incontinence and wears several pads every day.  This is sometimes a limiting factor for her socially.  She does go down for meals at her living facility.  She does not feel like she is particularly depressed.  She accepts that there are things that you \"cannot do anymore, even if you want to.  I make do\".    Social History:  Recently moved to [place] from [place].  Had 5 children, however unfortunately lost 2 of her sons suddenly within 6 months of each other in 2021.  Currently living at the [facility].  Was raised [facility] and was very involved in the [employer].  Social History     Tobacco Use     Smoking status: Never Smoker     Smokeless tobacco: Never Used   Substance Use Topics     Alcohol use: Not Currently     Family History- Reviewed in Epic.     Allergies   Allergen Reactions     Contrast Dye Anaphylaxis     Diagnostic [employer] Shortness Of Breath     Naproxen Rash     Face broke out in a rash       Iodine      Seasonal Allergies Other (See Comments)     Sinus drainage     Advanced Care Planning: There is a legal power of  document on file naming daughter. No HCD currently available to us.    Medications- Reviewed in Epic.    Past Medical History- Reviewed in [employer].    Past Surgical History- Reviewed in Epic.    Review of Systems:   ROS: 10 point ROS neg other than the symptoms noted above in the " HPI.    Physical Exam:   /65   Pulse 71   Resp 16   Wt 82.6 kg (182 lb)   SpO2 97%   BMI 32.76 kg/m    Constitutional: Alert, pleasant, no apparent distress. Sitting up in chair.  Eyes: Sclera non-icteric, no eye discharge.  ENT: No nasal discharge. Ears grossly normal.  Respiratory: Unlabored respirations. Speaking in full sentences.  Musculoskeletal: Extremities appear normal- no gross deformities noted. No edema noted on upper body.   Skin: No suspicious lesions or rashes on visible skin.  Neurologic: Clear speech, no aphasia. No facial droop.  Psychiatric: Mentation appears slowed,  struggles with short-term memory.  Appropriate attention. Affect normal/bright. Does not appear anxious or depressed.      Key Data Reviewed:  LABS: 4/21/22- Cr 1.02, GFR 52, Albumin 3.7, LFTs wnl. WBC 6.2, Hgb 12.4, Plts 318.     IMAGING: CT CAP 5/4/22- IMPRESSION:  1.  Mosaic attenuation pattern of the lungs is nonspecific but may indicate small vessel or small airways disease.  2.  There are a few scattered pulmonary nodules measuring less than 4 mm. No prior chest CT available for comparison. Attention at follow-up.  3.  No evidence of metastatic malignancy in the abdomen or pelvis.    Opioid Risk Tool (ORT):    Family History of Substance Abuse:        Alcohol = 0 pt (no)       Illegal Drugs = 0 pt (no)       Prescription Drugs = 0 pt (no)      Personal History of Substance Abuse:       Alcohol = 0 pt (no)       Illegal Drugs = 0 pt (no)       Prescription Drugs = 0 pt (no)        Age: 0 pt (age < 16; age > 45)      History of Pre-adolescent Sexual Abuse: 0 pt (no)      Psychological Disease: 0 pt (none)      ORT Score = 0        0-3: Low risk for opioid abuse       4-7: Moderate risk for opioid abuse       >/= 8: High risk for opioid abuse        Impression & Recommendations & Counseling:  Christiana Salazar is a 89 year old female with history of muscle invasive bladder cancer.    Pain - She does have a history of  chronic pain in her back that was treated previously with injections, and she has been on Norco for about 10 years.  She has started having an acute worsening of the back pain as well as some newer groin/pelvic pain.  Although her last set of scans in May showed no metastatic disease, this change in pain may be due to her bladder cancer.  She additionally has some arthritic pain in her hands and some other joints.  -Okay to continue Norco 1 tablet 2-3 times daily.  Generally takes 1 tablet twice daily.  Palliative care can take over prescribing this, as I do suspect she is or will be having more symptoms/pain from her bladder cancer.  ORT score of 0.  Refill sent today.  -Recommend continuing Tylenol up to 2000 mg daily.  Discussed being careful of additional doses with the Norco.  -Recommended trial of topical NSAID like Aspercreme for her hand pain.  She can use this up to 4 times daily.    Bladder cancer  Goals of care - Patient and daughter both confirm that she does not want to go through additional systemic therapy due to the risk of affecting her quality of life.  Because she has had some change in her symptoms, they are interested in follow-up with oncology for surveillance purposes.  They know that her scan in May had shown no evidence of metastatic disease.  They are wondering about the feasibility of having an additional scan performed for assistance with prognostic/planning purposes.  I provided the phone number for the oncology care coordinator so that they could reach out regarding setting up a follow-up visit sometime this fall.    Due to time constraints in this visit, we did not get into an in-depth advance care planning conversation.  There is no healthcare directive on file, and I would strongly recommend a conversation regarding CPR and intubation.  We will recommend having this at follow-up.    Additionally, she did seem to have some clear cognitive deficits today.  Daughter had to fill in the  "blanks about several things including symptoms and medication management.  While she states she is engaging with other residents of her facility, I do worry about her social situation and the fact that she is not established with a Muslim in [place] (incontinence has been a limiting factor as she has been worried about not knowing when she need to go to the bathroom when in Muslim).  Discussed that there are 's available through the oncology clinic and she can have a phone visit with one of them at any point in time.      Follow up: We will plan follow-up in January, however if they need something beforehand they can reach out.         Total time spent on day of encounter is 78 mins, including reviewing record, review of above studies, above visit with patient (FTF 12:42 PM-1:36 PM), symptomatic discussion of pain, including medication adjustments/prescription management, and documentation.     Yaritza Boggs DO  Palliative Medicine   Pager [phone], AMCOM ID [id number]    Some chart documentation performed using Dragon Voice recognition Software. Although reviewed after completion, some words and grammatical errors may remain.              Oncology Rooming Note    August 18, 2022 12:33 PM   Christiana Salazar is a 89 year old female who presents for:    Chief Complaint   Patient presents with     Oncology Clinic Visit     Initial Vitals: /65   Pulse 71   Resp 16   Wt 82.6 kg (182 lb)   SpO2 97%   BMI 32.76 kg/m   Estimated body mass index is 32.76 kg/m  as calculated from the following:    Height as of 5/25/22: 1.588 m (5' 2.5\").    Weight as of this encounter: 82.6 kg (182 lb). Body surface area is 1.91 meters squared.  No Pain (0) Comment: Data Unavailable   No LMP recorded.  Allergies reviewed: Yes  Medications reviewed: Yes    Medications: Medication refills not needed today.  Pharm[facility] entered into [id number] [place] [address]    Clinical concerns: "  doctor was notified.      Laurence Cochran, CMA                Again, thank you for allowing me to participate in the care of your patient.        Sincerely,        Yaritza Boggs, DO

## 2022-08-18 NOTE — PATIENT INSTRUCTIONS
Recommendations:  - I sent a refill for the [place] to your pharmacy.  Please give Carmelina a call at the number below about 4 to 5 days before you run out so that we can get a refill sent in time and you do not run out.  -Recommend continuing with Tylenol up to 4 tablets of the 500 mg daily.  -Also suggest trying Aspercreme (or Bengay), as these are topical anti-inflammatory creams.  You can apply this to your hands (and your shoulder or other joints) up to 4 times daily.  I would recommend scheduling at least 2 doses a day.  - To scheduled a follow up with Dr. Edgar from Oncology regarding surveillance for prognostic purposes, you can reach out to her nurse care coordinator:  Kedar Stiles, RN, BSN.  RN Care Coordinator   [facility] [place]   [phone]  -There are 's available through the oncology center, and they do telephone visits.  Please let 1 of us know if you think this would be helpful.      Follow up: We will plan follow-up with me in January after I return from leave, however if you need something beforehand, there are other doctors covering for me in the interim.      Reasons to Call    If you are having worsening/uncontrolled symptoms we want you to call!    You or your other physicians make any changes to medications we have prescribed.  -Please call for refills 4-5 days before you will run out of medication.    Important Phone Numbers    [place] [facility] - Carmelina Davis. Palliative Care RN - [phone]    [facility] [place] - Yu Chavez. Palliative Care RN - [phone]  *For Refills, scheduling, and general questions - [phone]  *After hours or on weekends. Will connect you with on call MD. [phone]

## 2022-09-19 NOTE — PROGRESS NOTES
"[facility]    Hematology/Oncology Established Patient Follow-up Note    Treatment Summary:      Today's Date: 9/20/22    Reason for Follow-up: Muscle invasive bladder cancer  Referring Provider: Trinity Sullivan MD      HISTORY OF PRESENT ILLNESS: Christiana Salazar is an 89 year old female who is referred for muscle invasive bladder cancer. Past medical history is significant for HTN, HLD, GERD, cognitive decline, and arthritis.     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 Donovan at [place]. She received induction BCG x 6 per her daughter who accompanies her today with complete response but no maintenance treatment was offered. Surveillance cystoscopy in November of 2021 showed evidence of recurrence for which she underwent TURBT on 12/17/2021 by Dr. Orlin Humphrey:      \"TURBT checklist  1) Number of tumors: 1  2) Size of largest tumor: 2 cm  3) Characteristic of tumor: papillary with some sloughing debris, superficial  4) Recurrent vs. Primary: primary  5) Presence of CIS: No  6) Bimanual exam under anesthesia: no  7) Visually complete resection: yes  9) Visualization of detrusor muscle in resection base: no  10) Visual evaluation of perforation: no\"     Pathology revealed poorly differentiated tumor with basaloid features with extensive involvement of detrusor muscle. She had a restaging CT urogram done on 1/4/2022 with no clear evidence of metastatic disease.      Patient lives in her own apartment at a nursing care facility. She is able to perform most ADLs without significant issue. She will meet with Rad Onc in early May 2022. She denies weight loss, fever, or evidence of hematuria.      INTERIM HISTORY:  Referred to Radiology downtown. Patient and daughter stated they arrived in the parking structure and decided against meeting with the team as they were " overwhelmed and did not want to receive therapy downtown. No complaints of hematuria currently. No pain, weight loss.    Patient and daughter again discussed and she did not end up meeting with radiation oncology with rescheduling to [facility].     Patient met with palliative care. She has not had follow up with urology. She confirms she still does not wish to proceed with systemic chemotherapy or radiation. No pain or hematuria currently.      REVIEW OF SYSTEMS:   A 14 point ROS was reviewed with pertinent positives and negatives in the HPI.       HOME MEDICATIONS:  Current Outpatient Medications   Medication Sig Dispense Refill     albuterol (PROVENTIL) (2.5 MG/3ML) 0.083% neb solution Inhale 2.5 mg into the lungs (Patient not taking: Reported on 8/18/2022)       Docusate Sodium (COLACE PO) Take by mouth 2 times daily       fluticasone (FLONASE) 50 MCG/ACT nasal spray 1 spray       furosemide (LASIX) 40 MG tablet Take 20 mg by mouth every other day       HYDROcodone-acetaminophen (NORCO) 5-325 MG tablet Take 1 tablet by mouth 3 times daily as needed for severe pain 90 tablet 0     lisinopril (ZESTRIL) 20 MG tablet lisinopril 20 mg tablet       methylPREDNISolone (MEDROL) 32 MG tablet Take 1 tablet (32mg) 12 hours and 2 hours prior to CT scan. (Patient not taking: Reported on 8/18/2022) 2 tablet 0     Multiple Vitamin (MULTI-VITAMINS) TABS Take 1 tablet by mouth daily       oxybutynin ER (DITROPAN XL) 5 MG 24 hr tablet Take 1 tablet (5 mg) by mouth daily 30 tablet 0     oxybutynin ER (DITROPAN-XL) 10 MG 24 hr tablet Take 10 mg by mouth       potassium aminobenzoate 500 MG CAPS capsule Take by mouth 2 times daily       simvastatin (ZOCOR) 40 MG tablet simvastatin 40 mg tablet       traZODone (DESYREL) 50 MG tablet trazodone 50 mg tablet           ALLERGIES:  Allergies   Allergen Reactions     Contrast Dye Anaphylaxis     Diagnostic X-Ray Materials Shortness Of Breath     Naproxen Rash     Face broke out in a rash        "Iodine      Seasonal Allergies Other (See Comments)     Sinus drainage         PAST MEDICAL HISTORY:  No past medical history on file.      PAST SURGICAL HISTORY:  No past surgical history on file.      SOCIAL HISTORY:  Social History     Socioeconomic History     Marital status:      Spouse name: Not on file     Number of children: Not on file     Years of education: Not on file     Highest education level: Not on file   Occupational History     Not on file   Tobacco Use     Smoking status: Never Smoker     Smokeless tobacco: Never Used   Substance and Sexual Activity     Alcohol use: Not Currently     Drug use: Not on file     Sexual activity: Not on file   Other Topics Concern     Not on file   Social History Narrative     Not on file     Social Determinants of Health     Financial Resource Strain: Not on file   Food Insecurity: Not on file   Transportation Needs: Not on file   Physical Activity: Not on file   Stress: Not on file   Social Connections: Not on file   Intimate Partner Violence: Not on file   Housing Stability: Not on file         FAMILY HISTORY:  No family history on file.      PHYSICAL EXAM:  Vital signs:  /59   Pulse 81   Temp 98.1  F (36.7  C) (Tympanic)   Resp 16   Ht 1.588 m (5' 2.5\")   Wt 84.3 kg (185 lb 14.4 oz)   SpO2 96%   BMI 33.46 kg/m     ECO.5  GENERAL/CONSTITUTIONAL: No acute distress.  RESPIRATORY: Clear to auscultation bilaterally. No crackles or wheezing.   CARDIOVASCULAR: Regular rate and rhythm without murmurs, gallops, or rubs.  GASTROINTESTINAL: No hepatosplenomegaly, masses, or tenderness. The patient has normal bowel sounds. No guarding.  No distention.  MUSCULOSKELETAL: Warm and well-perfused, no cyanosis, clubbing, or edema.  NEUROLOGIC: Cranial nerves II-XII are intact. Alert, oriented, answers questions appropriately.  INTEGUMENTARY: No rashes or jaundice.         LABS:   Latest Reference Range & Units 22 08:47   Sodium 133 - 144 mmol/L 139 "   Potassium 3.4 - 5.3 mmol/L 4.4   Chloride 94 - 109 mmol/L 107   Carbon Dioxide 20 - 32 mmol/L 26   Urea Nitrogen 7 - 30 mg/dL 24   Creatinine 0.52 - 1.04 mg/dL 1.02   GFR Estimate >60 mL/min/1.73m2 52 (L)   Calcium 8.5 - 10.1 mg/dL 9.6   Anion Gap 3 - 14 mmol/L 6   Albumin 3.4 - 5.0 g/dL 3.7   Protein Total 6.8 - 8.8 g/dL 7.1   Alkaline Phosphatase 40 - 150 U/L 81   ALT 0 - 50 U/L 17   AST 0 - 45 U/L 16   Bilirubin Total 0.2 - 1.3 mg/dL 0.4   Glucose 70 - 99 mg/dL 116 (H)   WBC 4.0 - 11.0 10e3/uL 6.2   Hemoglobin 11.7 - 15.7 g/dL 12.4   Hematocrit 35.0 - 47.0 % 38.6   Platelet Count 150 - 450 10e3/uL 318   RBC Count 3.80 - 5.20 10e6/uL 4.11   MCV 78 - 100 fL 94   MCH 26.5 - 33.0 pg 30.2   MCHC 31.5 - 36.5 g/dL 32.1   RDW 10.0 - 15.0 % 12.5   % Neutrophils % 59   % Lymphocytes % 25   % Monocytes % 11   % Eosinophils % 4   % Basophils % 1   Absolute Basophils 0.0 - 0.2 10e3/uL 0.1   Absolute Eosinophils 0.0 - 0.7 10e3/uL 0.3   Absolute Immature Granulocytes <=0.4 10e3/uL 0.0   Absolute Lymphocytes 0.8 - 5.3 10e3/uL 1.6   Absolute Monocytes 0.0 - 1.3 10e3/uL 0.7   % Immature Granulocytes % 0   Absolute Neutrophils 1.6 - 8.3 10e3/uL 3.7   Absolute NRBCs 10e3/uL 0.0   NRBCs per 100 WBC <1 /100 0   INR 0.85 - 1.15  1.07   PTT 22 - 38 Seconds 33           PATHOLOGY:  Surgical pathology                        Surgical Pathology Report     Patient Name: GERARDO HEATH                    Accession #: [id number]   Med: Rec.: [id number]                               Phone Number: [phone]   : 1932 (Age: 89)                          Gender: F   Client: [facility] - [facility]              Location: [facility]                                                     Taken: 2021                                                     Received: 2021                                                     Reported: 2021   Physician(s): GUERA BASS      ========================================================================   CLINICAL HISTORY   Malignant neoplasm of overlapping sites of bladder     OPERATIVE DIAGNOSES       Operation/Specimen: A: Urinary bladder, TURBT left anterior neck TUMOR       PATHOLOGICAL DIAGNOSIS:   A.  Urinary bladder, TURBT left anterior neck TUMOR:    . 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 See   comment    . Tumor invades into muscularis propria.     COMMENT   Sections show poorly differentiated carcinoma with basaloid features. No   definitive malignant surface component is present. Differential diagnosis   includes; urothelial carcinoma with basaloid features and basaloid squamous   cell carcinoma. It is hard to distinguish between these two differential   diagnosis based on morphologic and immunohistochemical features. Although we   favor that, this represent a bladder primary tumor, the possibility of   secondary bladder involvement by tumors from other organs; mainly gynecologic   tract, should be clinically ruled out.              Final Diagnosis   Urine, cytology:                 Interpretation:                  Atypical Urothelial Cells.  See comment.                 Adequacy:                 Satisfactory for evaluation.         IMAGING:  CT Urogram 1/10/22:  IMPRESSION:  1. No B/L hydroureteronephrosis. Mild thickening of the wall of the urinary bladder, a nonspecific finding. No convincing evidence of metastatic disease in the abdomen/pelvis.  2. Sub-6 mm low-attenuation left renal lesion, progressed in size from CT 5/19/15. This is too small to characterize, statistically represents cyst.  3. Stable additional incidental findings.      CT CAP 5/4/22:                                                                 IMPRESSION:  1.  Mosaic attenuation pattern of the lungs is nonspecific but may  indicate small vessel or small airways disease.  2.  There are a few scattered pulmonary nodules  measuring less than 4  mm. No prior chest CT available for comparison. Attention at  follow-up.  3.  No evidence of metastatic malignancy in the abdomen or pelvis.       ASSESSMENT/PLAN:  Shania Salazar is an 89 year old female who is referred for muscle invasive bladder cancer. Past medical history is significant for HTN, HLD, GERD, cognitive decline, and arthritis.     1) Muscle-invasive urothelial carcinoma  -Originally diagnosed in November 2020 (non-muscle invasive, but high-risk) s/p BCG x6.  -Recurrent, muscle-invasive disease diagnosed in November-December 2021.  -Patient has been deemed a poor surgical candidate for cystectomy, to which I am in agreement. Furthermore, patient and daughter would elect against cystectomy.  -We discussed superior locoregional DFS (absolute difference of 12% at 2 years favoring chemoRT). The 5 year overall survival rates are approximately 48% with chemoRT and 35% with radiation alone (N Engl J Med 2012; 366:5331-8806 DOI: 10.1056/WAFYoh8987289).  -We reviewed treatment with mitomycin and fluorouracil:     28-45 day cycle for 1 cycle with concurrent RT  -Mitomycin 12 mg/m2 IV Day 1  -Fluorouracil 500 mg/m2 IV continuous over 24 hours Days 1-5 and 22-26     -Patient and daughter inform me they have come to the conclusion they will not move forward with systemic chemotherapy. They have significant reservation over toxicity and prize quality of life given the fact that patient is still quite active with crocheting and gardening. I discussed this regimen is typically well-tolerated, but side effects due include marrow suppression with increased risk of neutropenia/infection, blood transfusion, diarrhea, nausea/vomiting, cardiac events.   -Initially, patient stating she would not be willing to move forward with radiation alone, but I have asked if she would be willing to reschedule with Radiation Oncology as she has not had evaluation to make a fully informed decision. Patient  continues to be open to discussion with Radiation Oncology. She canceled her visit previously as this was set for [facility]. Referral placed for [facility].  -5/25/22: Patient did not end up meeting with [facility]. She and daughter have discussed again and patient does not wish to proceed with chemoradiation or radiation at this time valuing her quality of life. She is to turn 89 in three weeks and a large birthday gathering is planned. They are open to meeting with palliative care at this time to assist in advanced care planning. Patient is asymptomatic at this time, but is likely to develop pain and fatigue as her underlying malignancy progresses. Patient may require follow up with Urology. They also wish to speak with social work in regards to supplies.  -Patient and daughter met with hospice. In review of records, it appears time was limited and they were not able to discussed advanced care planning.      2) Patient confirms she does not wish to proceed with systemic treatment at this time. Can monitor with labs and imaging every 12 weeks to assess for progression. I discussed she is not hospice appropriate at this time, but will require services in the future with progression as she will not be agreeable to treatment. I had discussed if she has hematuria in the future, will need urology evaluation.    3) Follow up in 12 weeks with [facility] with repeat labs and imaging.        Florina Edgar DO  Hematology/Oncology  [facility]

## 2022-09-20 ENCOUNTER — ONCOLOGY VISIT (OUTPATIENT)
Dept: ONCOLOGY | Facility: CLINIC | Age: 87
End: 2022-09-20
Attending: INTERNAL MEDICINE
Payer: MEDICARE

## 2022-09-20 VITALS
OXYGEN SATURATION: 96 % | HEIGHT: 63 IN | HEART RATE: 81 BPM | DIASTOLIC BLOOD PRESSURE: 59 MMHG | WEIGHT: 185.9 LBS | TEMPERATURE: 98.1 F | RESPIRATION RATE: 16 BRPM | SYSTOLIC BLOOD PRESSURE: 108 MMHG | BODY MASS INDEX: 32.94 KG/M2

## 2022-09-20 DIAGNOSIS — C67.9 MALIGNANT NEOPLASM OF URINARY BLADDER, UNSPECIFIED SITE (H): Primary | ICD-10-CM

## 2022-09-20 PROCEDURE — G0463 HOSPITAL OUTPT CLINIC VISIT: HCPCS

## 2022-09-20 PROCEDURE — 99214 OFFICE O/P EST MOD 30 MIN: CPT | Performed by: INTERNAL MEDICINE

## 2022-09-20 ASSESSMENT — PAIN SCALES - GENERAL: PAINLEVEL: NO PAIN (0)

## 2022-09-20 NOTE — NURSING NOTE
"Oncology Rooming Note    September 20, 2022 2:09 PM   Christiana Salazar is a 89 year old female who presents for:    Chief Complaint   Patient presents with     Oncology Clinic Visit     Malignant neoplasm of urinary bladder, unspecified site     Initial Vitals: /59   Pulse 81   Temp 98.1  F (36.7  C) (Tympanic)   Resp 16   Ht 1.588 m (5' 2.5\")   Wt 84.3 kg (185 lb 14.4 oz)   SpO2 96%   BMI 33.46 kg/m   Estimated body mass index is 33.46 kg/m  as calculated from the following:    Height as of this encounter: 1.588 m (5' 2.5\").    Weight as of this encounter: 84.3 kg (185 lb 14.4 oz). Body surface area is 1.93 meters squared.  No Pain (0) Comment: Data Unavailable   No LMP recorded.  Allergies reviewed: Yes  Medications reviewed: Yes    Medications: Medication refills not needed today.  Pharmacy name entered into [facility] [employer]: [id number] [place] [facility] [place], [place] [address] [facility] [address]    Clinical concerns: f/u       Maria Isabel Hoyt CMA              "

## 2022-09-20 NOTE — LETTER
"    9/20/2022         RE: Christiana Salazar  [id number] [address] [facility] [address]        Dear Colleague,    Thank you for referring your patient, Christiana Salazar, to the [facility]. Please see a copy of my visit note below.    [facility]    Hematology/Oncology Established Patient Follow-up Note    Treatment Summary:      Today's Date: 9/20/22    Reason for Follow-up: Muscle invasive bladder cancer  Referring Provider: Trinity Sullivan MD      HISTORY OF PRESENT ILLNESS: Christiana Salazar is an 89 year old female who is referred for muscle invasive bladder cancer. Past medical history is significant for HTN, HLD, GERD, cognitive decline, and arthritis.     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 Donovan at [place]. She received induction BCG x 6 per her daughter who accompanies her today with complete response but no maintenance treatment was offered. Surveillance cystoscopy in November of 2021 showed evidence of recurrence for which she underwent TURBT on 12/17/2021 by Dr. Orlin Humphrey:      \"TURBT checklist  1) Number of tumors: 1  2) Size of largest tumor: 2 cm  3) Characteristic of tumor: papillary with some sloughing debris, superficial  4) Recurrent vs. Primary: primary  5) Presence of CIS: No  6) Bimanual exam under anesthesia: no  7) Visually complete resection: yes  9) Visualization of detrusor muscle in resection base: no  10) Visual evaluation of perforation: no\"     Pathology revealed poorly differentiated tumor with basaloid features with extensive involvement of detrusor muscle. She had a restaging CT urogram done on 1/4/2022 with no clear evidence of metastatic disease.      Patient lives in her own apartment at a nursing care facility. She is able to perform most ADLs without significant issue. She will meet with Rad " Onc in early May 2022. She denies weight loss, fever, or evidence of hematuria.      INTERIM HISTORY:  Referred to Radiology downtown. Patient and daughter stated they arrived in the parking structure and decided against meeting with the team as they were overwhelmed and did not want to receive therapy downtown. No complaints of hematuria currently. No pain, weight loss.    Patient and daughter again discussed and she did not end up meeting with radiation oncology with [place] [facility]     Patient met with palliative care. She has not had follow up with urology. She confirms she still does not wish to proceed with systemic chemotherapy or radiation. No pain or hematuria currently.      REVIEW OF SYSTEMS:   A 14 point ROS was reviewed with pertinent positives and negatives in the HPI.       HOME MEDICATIONS:  Current Outpatient Medications   Medication Sig Dispense Refill     albuterol (PROVENTIL) (2.5 MG/3ML) 0.083% neb solution Inhale 2.5 mg into the lungs (Patient not taking: Reported on 8/18/2022)       Docusate Sodium (COLACE PO) Take by mouth 2 times daily       fluticasone (FLONASE) 50 MCG/ACT nasal spray 1 spray       furosemide (LASIX) 40 MG tablet Take 20 mg by mouth every other day       HYDROcodone-acetaminophen (NORCO) 5-325 MG tablet Take 1 tablet by mouth 3 times daily as needed for severe pain 90 tablet 0     lisinopril (ZESTRIL) 20 MG tablet lisinopril 20 mg tablet       methylPREDNISolone (MEDROL) 32 MG tablet Take 1 tablet (32mg) 12 hours and 2 hours prior to CT scan. (Patient not taking: Reported on 8/18/2022) 2 tablet 0     Multiple Vitamin (MULTI-VITAMINS) TABS Take 1 tablet by mouth daily       oxybutynin ER (DITROPAN XL) 5 MG 24 hr tablet Take 1 tablet (5 mg) by mouth daily 30 tablet 0     oxybutynin ER (DITROPAN-XL) 10 MG 24 hr tablet Take 10 mg by mouth       potassium aminobenzoate 500 MG CAPS capsule Take by mouth 2 times daily       simvastatin (ZOCOR) 40 MG tablet simvastatin 40 mg  "tablet       traZODone (DESYREL) 50 MG tablet trazodone 50 mg tablet           ALLERGIES:  Allergies   Allergen Reactions     Contrast Dye Anaphylaxis     Diagnostic X-Ray Materials Shortness Of Breath     Naproxen Rash     Face broke out in a rash       Iodine      Seasonal Allergies Other (See Comments)     Sinus drainage         PAST MEDICAL HISTORY:  No past medical history on file.      PAST SURGICAL HISTORY:  No past surgical history on file.      SOCIAL HISTORY:  Social History     Socioeconomic History     Marital status:      Spouse name: Not on file     Number of children: Not on file     Years of education: Not on file     Highest education level: Not on file   Occupational History     Not on file   Tobacco Use     Smoking status: Never Smoker     Smokeless tobacco: Never Used   Substance and Sexual Activity     Alcohol use: Not Currently     Drug use: Not on file     Sexual activity: Not on file   Other Topics Concern     Not on file   Social History Narrative     Not on file     Social Determinants of Health     Financial Resource Strain: Not on file   Food Insecurity: Not on file   Transportation Needs: Not on file   Physical Activity: Not on file   Stress: Not on file   Social Connections: Not on file   Intimate Partner Violence: Not on file   Housing Stability: Not on file         FAMILY HISTORY:  No family history on file.      PHYSICAL EXAM:  Vital signs:  /59   Pulse 81   Temp 98.1  F (36.7  C) (Tympanic)   Resp 16   Ht 1.588 m (5' 2.5\")   Wt 84.3 kg (185 lb 14.4 oz)   SpO2 96%   BMI 33.46 kg/m     ECO.5  GENERAL/CONSTITUTIONAL: No acute distress.  RESPIRATORY: Clear to auscultation bilaterally. No crackles or wheezing.   CARDIOVASCULAR: Regular rate and rhythm without murmurs, gallops, or rubs.  GASTROINTESTINAL: No hepatosplenomegaly, masses, or tenderness. The patient has normal bowel sounds. No guarding.  No distention.  MUSCULOSKELETAL: Warm and well-perfused, no " cyanosis, clubbing, or edema.  NEUROLOGIC: Cranial nerves II-XII are intact. Alert, oriented, answers questions appropriately.  INTEGUMENTARY: No rashes or jaundice.         LABS:   Latest Reference Range & Units 22 08:47   Sodium 133 - 144 mmol/L 139   Potassium 3.4 - 5.3 mmol/L 4.4   Chloride 94 - 109 mmol/L 107   Carbon Dioxide 20 - 32 mmol/L 26   Urea Nitrogen 7 - 30 mg/dL 24   Creatinine 0.52 - 1.04 mg/dL 1.02   GFR Estimate >60 mL/min/1.73m2 52 (L)   Calcium 8.5 - 10.1 mg/dL 9.6   Anion Gap 3 - 14 mmol/L 6   Albumin 3.4 - 5.0 g/dL 3.7   Protein Total 6.8 - 8.8 g/dL 7.1   Alkaline Phosphatase 40 - 150 U/L 81   ALT 0 - 50 U/L 17   AST 0 - 45 U/L 16   Bilirubin Total 0.2 - 1.3 mg/dL 0.4   Glucose 70 - 99 mg/dL 116 (H)   WBC 4.0 - 11.0 10e3/uL 6.2   Hemoglobin 11.7 - 15.7 g/dL 12.4   Hematocrit 35.0 - 47.0 % 38.6   Platelet Count 150 - 450 10e3/uL 318   RBC Count 3.80 - 5.20 10e6/uL 4.11   MCV 78 - 100 fL 94   MCH 26.5 - 33.0 pg 30.2   MCHC 31.5 - 36.5 g/dL 32.1   RDW 10.0 - 15.0 % 12.5   % Neutrophils % 59   % Lymphocytes % 25   % Monocytes % 11   % Eosinophils % 4   % Basophils % 1   Absolute Basophils 0.0 - 0.2 10e3/uL 0.1   Absolute Eosinophils 0.0 - 0.7 10e3/uL 0.3   Absolute Immature Granulocytes <=0.4 10e3/uL 0.0   Absolute Lymphocytes 0.8 - 5.3 10e3/uL 1.6   Absolute Monocytes 0.0 - 1.3 10e3/uL 0.7   % Immature Granulocytes % 0   Absolute Neutrophils 1.6 - 8.3 10e3/uL 3.7   Absolute NRBCs 10e3/uL 0.0   NRBCs per 100 WBC <1 /100 0   INR 0.85 - 1.15  1.07   PTT 22 - 38 Seconds 33           PATHOLOGY:  Surgical pathology                        Surgical Pathology Report     Patient Name: GERARDO HEATH                    Accession #: [id number]   Med: Rec.: [id number]                               Phone Number: [phone]   : 1932 (Age: 89)                          Gender: F   Client: [facility] - [facility]              Location: [facility])                                                      Taken: 12/17/2021                                                     Received: 12/20/2021                                                     Reported: 12/23/2021   Physician(s): GUERA BASS     ========================================================================   CLINICAL HISTORY   Malignant neoplasm of overlapping sites of bladder     OPERATIVE DIAGNOSES       Operation/Specimen: A: Urinary bladder, TURBT left anterior neck TUMOR       PATHOLOGICAL DIAGNOSIS:   A.  Urinary bladder, TURBT left anterior neck TUMOR:    . 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 See   comment    . Tumor invades into muscularis propria.     COMMENT   Sections show poorly differentiated carcinoma with basaloid features. No   definitive malignant surface component is present. Differential diagnosis   includes; urothelial carcinoma with basaloid features and basaloid squamous   cell carcinoma. It is hard to distinguish between these two differential   diagnosis based on morphologic and immunohistochemical features. Although we   favor that, this represent a bladder primary tumor, the possibility of   secondary bladder involvement by tumors from other organs; mainly gynecologic   tract, should be clinically ruled out.              Final Diagnosis   Urine, cytology:                 Interpretation:                  Atypical Urothelial Cells.  See comment.                 Adequacy:                 Satisfactory for evaluation.         IMAGING:  CT Urogram 1/10/22:  IMPRESSION:  1. No B/L hydroureteronephrosis. Mild thickening of the wall of the urinary bladder, a nonspecific finding. No convincing evidence of metastatic disease in the abdomen/pelvis.  2. Sub-6 mm low-attenuation left renal lesion, progressed in size from CT 5/19/15. This is too small to characterize, statistically represents cyst.  3. Stable additional incidental findings.      CT CAP  5/4/22:                                                                 IMPRESSION:  1.  Mosaic attenuation pattern of the lungs is nonspecific but may  indicate small vessel or small airways disease.  2.  There are a few scattered pulmonary nodules measuring less than 4  mm. No prior chest CT available for comparison. Attention at  follow-up.  3.  No evidence of metastatic malignancy in the abdomen or pelvis.       ASSESSMENT/PLAN:  Shania Salazar is an 89 year old female who is referred for muscle invasive bladder cancer. Past medical history is significant for HTN, HLD, GERD, cognitive decline, and arthritis.     1) Muscle-invasive urothelial carcinoma  -Originally diagnosed in November 2020 (non-muscle invasive, but high-risk) s/p BCG x6.  -Recurrent, muscle-invasive disease diagnosed in November-December 2021.  -Patient has been deemed a poor surgical candidate for cystectomy, to which I am in agreement. Furthermore, patient and daughter would elect against cystectomy.  -We discussed superior locoregional DFS (absolute difference of 12% at 2 years favoring chemoRT). The 5 year overall survival rates are approximately 48% with chemoRT and 35% with radiation alone (N Engl J Med 2012; 366:3434-0254 DOI: 10.1056/RGHDyp4237097).  -We reviewed treatment with mitomycin and fluorouracil:     28-45 day cycle for 1 cycle with concurrent RT  -Mitomycin 12 mg/m2 IV Day 1  -Fluorouracil 500 mg/m2 IV continuous over 24 hours Days 1-5 and 22-26     -Patient and daughter inform me they have come to the conclusion they will not move forward with systemic chemotherapy. They have significant reservation over toxicity and prize quality of life given the fact that patient is still quite active with crocheting and gardening. I discussed this regimen is typically well-tolerated, but side effects due include marrow suppression with increased risk of neutropenia/infection, blood transfusion, diarrhea, nausea/vomiting, cardiac events.    -Initially, patient stating she would not be willing to move forward with radiation alone, but I have asked if she would be willing to reschedule with Radiation Oncology as she has not had evaluation to make a fully informed decision. Patient continues to be open to discussion with Radiation Oncology. She canceled her visit previously as this was [place]. Referral placed for [facility].  -5/25/22: Patient did not end up meeting with [facility]. She and daughter have discussed again and patient does not wish to proceed with chemoradiation or radiation at this time valuing her quality of life. She is to turn 89 in three weeks and a large birthday gathering is planned. They are open to meeting with palliative care at this time to assist in advanced care planning. Patient is asymptomatic at this time, but is likely to develop pain and fatigue as her underlying malignancy progresses. Patient may require follow up with Urology. They also wish to speak with social work in regards to supplies.  -Patient and daughter met with hospice. In review of records, it appears time was limited and they were not able to discussed advanced care planning.      2) Patient confirms she does not wish to proceed with systemic treatment at this time. Can monitor with labs and imaging every 12 weeks to assess for progression. I discussed she is not hospice appropriate at this time, but will require services in the future with progression as she will not be agreeable to treatment. I had discussed if she has hematuria in the future, will need urology evaluation.    3) Follow up in 12 weeks with [facility] with repeat labs and imaging.        Florina Edgar DO  Hematology/Oncology  [facility]          Again, thank you for allowing me to participate in the care of your patient.        Sincerely,        Florina Edgar DO

## 2022-09-23 NOTE — PATIENT INSTRUCTIONS
Christiana is scheduled for the following:    - Labs on 12/08/22 at 12:45  - CT-CAP on 12/08/22 at 1:00 pm  - Dr. Edgar appointment on 12/15/22 at 12:30 pm    Rachael Walters RN on 9/23/2022 at 3:26 PM

## 2022-10-27 DIAGNOSIS — G89.3 CANCER RELATED PAIN: ICD-10-CM

## 2022-10-27 RX ORDER — HYDROCODONE BITARTRATE AND ACETAMINOPHEN 5; 325 MG/1; MG/1
TABLET ORAL
Qty: 90 TABLET | Refills: 0 | Status: SHIPPED | OUTPATIENT
Start: 2022-10-27 | End: 2022-12-14

## 2022-10-28 ENCOUNTER — TELEPHONE (OUTPATIENT)
Dept: ONCOLOGY | Facility: CLINIC | Age: 87
End: 2022-10-28

## 2022-10-28 NOTE — TELEPHONE ENCOUNTER
----- Message from Judy Davis RN sent at 10/27/2022 11:18 AM CDT -----  Regarding: Video or in person rtn with [facility]  Good morning,    Please call pt's dtr to schedule a video or in person rtn with [facility]K sometime in Jan or Feb.    Thanks,    Carmelina

## 2022-11-03 NOTE — TELEPHONE ENCOUNTER
It is ok for facility staff to give Ms Salazar her medications including the Norco (Hydrocodone-Acetaminophen).     She should be given Hydrocodone-Acetaminophen 5-325 one tablet by mouth twice a day.   She can take one additional tablet of Hydrocodone-Acetaminophen daily as needed for breakthrough pain.    Melva Block MD  Palliative Medicine  Pager [phone]

## 2022-12-07 DIAGNOSIS — C67.9 MALIGNANT NEOPLASM OF URINARY BLADDER, UNSPECIFIED SITE (H): Primary | ICD-10-CM

## 2022-12-08 ENCOUNTER — LAB (OUTPATIENT)
Dept: INFUSION THERAPY | Facility: CLINIC | Age: 87
End: 2022-12-08
Attending: INTERNAL MEDICINE
Payer: MEDICARE

## 2022-12-08 ENCOUNTER — HOSPITAL ENCOUNTER (OUTPATIENT)
Dept: CT IMAGING | Facility: CLINIC | Age: 87
Discharge: HOME | End: 2022-12-08
Attending: INTERNAL MEDICINE | Admitting: INTERNAL MEDICINE
Payer: MEDICARE

## 2022-12-08 DIAGNOSIS — C67.9 MALIGNANT NEOPLASM OF URINARY BLADDER, UNSPECIFIED SITE (H): ICD-10-CM

## 2022-12-08 LAB
ALBUMIN SERPL BCG-MCNC: 4.1 G/DL (ref 3.5–5.2)
ALP SERPL-CCNC: 103 U/L (ref 35–104)
ALT SERPL W P-5'-P-CCNC: 11 U/L (ref 10–35)
ANION GAP SERPL CALCULATED.3IONS-SCNC: 13 MMOL/L (ref 7–15)
AST SERPL W P-5'-P-CCNC: 18 U/L (ref 10–35)
BASOPHILS # BLD AUTO: 0.1 10E3/UL (ref 0–0.2)
BASOPHILS NFR BLD AUTO: 1 %
BILIRUB SERPL-MCNC: 0.3 MG/DL
BUN SERPL-MCNC: 35.1 MG/DL (ref 8–23)
CALCIUM SERPL-MCNC: 9.6 MG/DL (ref 8.2–9.6)
CHLORIDE SERPL-SCNC: 105 MMOL/L (ref 98–107)
CREAT SERPL-MCNC: 1.07 MG/DL (ref 0.51–0.95)
DEPRECATED HCO3 PLAS-SCNC: 23 MMOL/L (ref 22–29)
EOSINOPHIL # BLD AUTO: 0.4 10E3/UL (ref 0–0.7)
EOSINOPHIL NFR BLD AUTO: 5 %
ERYTHROCYTE [DISTWIDTH] IN BLOOD BY AUTOMATED COUNT: 12.5 % (ref 10–15)
GFR SERPL CREATININE-BSD FRML MDRD: 49 ML/MIN/1.73M2
GLUCOSE SERPL-MCNC: 104 MG/DL (ref 70–99)
HCT VFR BLD AUTO: 38.3 % (ref 35–47)
HGB BLD-MCNC: 12.1 G/DL (ref 11.7–15.7)
IMM GRANULOCYTES # BLD: 0 10E3/UL
IMM GRANULOCYTES NFR BLD: 0 %
LYMPHOCYTES # BLD AUTO: 2.1 10E3/UL (ref 0.8–5.3)
LYMPHOCYTES NFR BLD AUTO: 29 %
MCH RBC QN AUTO: 29.8 PG (ref 26.5–33)
MCHC RBC AUTO-ENTMCNC: 31.6 G/DL (ref 31.5–36.5)
MCV RBC AUTO: 94 FL (ref 78–100)
MONOCYTES # BLD AUTO: 0.8 10E3/UL (ref 0–1.3)
MONOCYTES NFR BLD AUTO: 10 %
NEUTROPHILS # BLD AUTO: 4 10E3/UL (ref 1.6–8.3)
NEUTROPHILS NFR BLD AUTO: 55 %
NRBC # BLD AUTO: 0 10E3/UL
NRBC BLD AUTO-RTO: 0 /100
PLATELET # BLD AUTO: 277 10E3/UL (ref 150–450)
POTASSIUM SERPL-SCNC: 4.7 MMOL/L (ref 3.4–5.3)
PROT SERPL-MCNC: 6.6 G/DL (ref 6.4–8.3)
RBC # BLD AUTO: 4.06 10E6/UL (ref 3.8–5.2)
SODIUM SERPL-SCNC: 141 MMOL/L (ref 136–145)
WBC # BLD AUTO: 7.3 10E3/UL (ref 4–11)

## 2022-12-08 PROCEDURE — 85025 COMPLETE CBC W/AUTO DIFF WBC: CPT | Performed by: INTERNAL MEDICINE

## 2022-12-08 PROCEDURE — 36415 COLL VENOUS BLD VENIPUNCTURE: CPT

## 2022-12-08 PROCEDURE — 71250 CT THORAX DX C-: CPT | Mod: MG

## 2022-12-08 PROCEDURE — 80053 COMPREHEN METABOLIC PANEL: CPT | Performed by: INTERNAL MEDICINE

## 2022-12-08 NOTE — PROGRESS NOTES
Nursing Note:  Christiana Salazar presents today for labs.    Patient seen by provider today: No   present during visit today: Not Applicable.    Note: N/A.    Intravenous Access:  Lab draw site left lateral wrist, Needle type butterfly, Gauge 23.  Labs drawn without difficulty.    Discharge Plan:   Patient was sent to imaging for CT appointment.    Abigail Khan RN

## 2022-12-13 ENCOUNTER — TELEPHONE (OUTPATIENT)
Dept: ONCOLOGY | Facility: CLINIC | Age: 87
End: 2022-12-13

## 2022-12-13 DIAGNOSIS — G89.3 CANCER RELATED PAIN: ICD-10-CM

## 2022-12-13 NOTE — TELEPHONE ENCOUNTER
[facility] calling in to request a refill for pt's Norco Rx.    This will be the first refill with the new pharmacy so they wanted to reach out to make sure it was sent to them.    Phone number for pharmacy is [phone].    Will route to care team for follow up.

## 2022-12-13 NOTE — TELEPHONE ENCOUNTER
Received message from oncology RNCC that pt is needing a refill of Norco.     Last refill: 10/27/22  Last office visit: 8/18/22  Scheduled for follow up 2/9/23     Will route request to MD for review.     Reviewed [place]  Report.

## 2022-12-14 RX ORDER — HYDROCODONE BITARTRATE AND ACETAMINOPHEN 5; 325 MG/1; MG/1
TABLET ORAL
Qty: 90 TABLET | Refills: 0 | Status: SHIPPED | OUTPATIENT
Start: 2022-12-14 | End: 2023-01-06

## 2022-12-15 ENCOUNTER — ONCOLOGY VISIT (OUTPATIENT)
Dept: ONCOLOGY | Facility: CLINIC | Age: 87
End: 2022-12-15
Attending: INTERNAL MEDICINE
Payer: MEDICARE

## 2022-12-15 VITALS
OXYGEN SATURATION: 97 % | HEIGHT: 63 IN | BODY MASS INDEX: 32.48 KG/M2 | RESPIRATION RATE: 16 BRPM | SYSTOLIC BLOOD PRESSURE: 108 MMHG | DIASTOLIC BLOOD PRESSURE: 54 MMHG | WEIGHT: 183.3 LBS | TEMPERATURE: 97.2 F | HEART RATE: 76 BPM

## 2022-12-15 DIAGNOSIS — C67.9 MALIGNANT NEOPLASM OF URINARY BLADDER, UNSPECIFIED SITE (H): ICD-10-CM

## 2022-12-15 PROCEDURE — G0463 HOSPITAL OUTPT CLINIC VISIT: HCPCS | Performed by: INTERNAL MEDICINE

## 2022-12-15 PROCEDURE — 99214 OFFICE O/P EST MOD 30 MIN: CPT | Performed by: INTERNAL MEDICINE

## 2022-12-15 ASSESSMENT — PAIN SCALES - GENERAL: PAINLEVEL: NO PAIN (0)

## 2022-12-15 NOTE — NURSING NOTE
"Oncology Rooming Note    December 15, 2022 12:26 PM   Christiana Salazar is a 89 year old female who presents for:    Chief Complaint   Patient presents with     Oncology Clinic Visit     Malignant neoplasm of urinary bladder, unspecified site      Initial Vitals: /54   Pulse 76   Temp 97.2  F (36.2  C) (Tympanic)   Resp 16   Ht 1.588 m (5' 2.5\")   Wt 83.1 kg (183 lb 4.8 oz)   SpO2 97%   BMI 32.99 kg/m   Estimated body mass index is 32.99 kg/m  as calculated from the following:    Height as of this encounter: 1.588 m (5' 2.5\").    Weight as of this encounter: 83.1 kg (183 lb 4.8 oz). Body surface area is 1.91 meters squared.  No Pain (0) Comment: Data Unavailable   No LMP recorded.  Allergies reviewed: Yes  Medications reviewed: Yes    Medications: Medication refills not needed today.  Pharmacy name entered into [facility]:    [id number] [place] [facility] [place] [address] [address]  [facility] - [address]    Clinical concerns: follow up        Lucille Nogueira CMA              "

## 2022-12-15 NOTE — LETTER
"    12/15/2022         RE: Christiana Salazar  [address] [facility] [address]        Dear Colleague,    Thank you for referring your patient, Christiana Salazar, to the [facility]. Please see a copy of my visit note below.    [facility]    Hematology/Oncology Established Patient Follow-up Note    Treatment Summary:      Today's Date: 12/15/22    Reason for Follow-up: Muscle invasive bladder cancer  Referring Provider: Trinity Sullivan MD      HISTORY OF PRESENT ILLNESS: Christiana Salazar is a 89 year old female who is referred for muscle invasive bladder cancer. Past medical history is significant for HTN, HLD, GERD, cognitive decline, and arthritis.     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 Donovan at [place]. She received induction BCG x 6 per her daughter who accompanies her today with complete response but no maintenance treatment was offered. Surveillance cystoscopy in November of 2021 showed evidence of recurrence for which she underwent TURBT on 12/17/2021 by Dr. Orlin Humphrey:      \"TURBT checklist  1) Number of tumors: 1  2) Size of largest tumor: 2 cm  3) Characteristic of tumor: papillary with some sloughing debris, superficial  4) Recurrent vs. Primary: primary  5) Presence of CIS: No  6) Bimanual exam under anesthesia: no  7) Visually complete resection: yes  9) Visualization of detrusor muscle in resection base: no  10) Visual evaluation of perforation: no\"     Pathology revealed poorly differentiated tumor with basaloid features with extensive involvement of detrusor muscle. She had a restaging CT urogram done on 1/4/2022 with no clear evidence of metastatic disease.      Patient lives in her own apartment at a nursing care facility. She is able to perform most ADLs without significant issue. She will meet with " Rad Onc in early May 2022. She denies weight loss, fever, or evidence of hematuria.    Referred to Radiology downtown. Patient and daughter stated they arrived in the parking structure and decided against meeting with the team as they were overwhelmed and did not want to receive therapy downtown. No complaints of hematuria currently. No pain, weight loss.    Patient and daughter again discussed and she did not end up meeting with radiation oncology with rescheduling to O.     Patient met with palliative care. She has not had follow up with urology. She confirms she still does not wish to proceed with systemic chemotherapy or radiation.      INTERIM HISTORY:  No pain or hematuria currently. Daughter states dementia is worsening. Patient has been enjoying watching gamesWeSpekes as she cannot garden. She is still crocheting. Weight is stable. Good PO intake.       REVIEW OF SYSTEMS:   A 14 point ROS was reviewed with pertinent positives and negatives in the HPI.       HOME MEDICATIONS:  Current Outpatient Medications   Medication Sig Dispense Refill     albuterol (PROVENTIL) (2.5 MG/3ML) 0.083% neb solution Inhale 2.5 mg into the lungs       Docusate Sodium (COLACE PO) Take by mouth 2 times daily       fluticasone (FLONASE) 50 MCG/ACT nasal spray 1 spray       furosemide (LASIX) 40 MG tablet Take 20 mg by mouth every other day       HYDROcodone-acetaminophen (NORCO) 5-325 MG tablet Take 1 tablet by mouth 2 times daily. May also take 1 tablet daily as needed for severe pain (7-10). 90 tablet 0     lisinopril (ZESTRIL) 20 MG tablet lisinopril 20 mg tablet       methylPREDNISolone (MEDROL) 32 MG tablet Take 1 tablet (32mg) 12 hours and 2 hours prior to CT scan. 2 tablet 0     Multiple Vitamin (MULTI-VITAMINS) TABS Take 1 tablet by mouth daily       oxybutynin ER (DITROPAN-XL) 10 MG 24 hr tablet Take 10 mg by mouth 2 times daily       potassium aminobenzoate 500 MG CAPS capsule Twice a week       simvastatin (ZOCOR) 40 MG  "tablet At Bedtime       traZODone (DESYREL) 50 MG tablet trazodone 50 mg tablet           ALLERGIES:  Allergies   Allergen Reactions     Contrast Dye Anaphylaxis     Diagnostic X-Ray Materials Shortness Of Breath     Naproxen Rash     Face broke out in a rash       Iodine      Seasonal Allergies Other (See Comments)     Sinus drainage         PAST MEDICAL HISTORY:  No past medical history on file.      PAST SURGICAL HISTORY:  No past surgical history on file.      SOCIAL HISTORY:  Social History     Socioeconomic History     Marital status:      Spouse name: Not on file     Number of children: Not on file     Years of education: Not on file     Highest education level: Not on file   Occupational History     Not on file   Tobacco Use     Smoking status: Never     Smokeless tobacco: Never   Substance and Sexual Activity     Alcohol use: Not Currently     Drug use: Not on file     Sexual activity: Not on file   Other Topics Concern     Not on file   Social History Narrative     Not on file     Social Determinants of Health     Financial Resource Strain: Not on file   Food Insecurity: Not on file   Transportation Needs: Not on file   Physical Activity: Not on file   Stress: Not on file   Social Connections: Not on file   Intimate Partner Violence: Not on file   Housing Stability: Not on file         FAMILY HISTORY:  No family history on file.      PHYSICAL EXAM:  Vital signs:  /54   Pulse 76   Temp 97.2  F (36.2  C) (Tympanic)   Resp 16   Ht 1.588 m (5' 2.5\")   Wt 83.1 kg (183 lb 4.8 oz)   SpO2 97%   BMI 32.99 kg/m     ECO  GENERAL/CONSTITUTIONAL: No acute distress. Chronically ill appearing.   RESPIRATORY: Clear to auscultation bilaterally. No crackles or wheezing.   CARDIOVASCULAR: Regular rate and rhythm without murmurs, gallops, or rubs.  MUSCULOSKELETAL: Warm and well-perfused, no cyanosis, clubbing, or edema.  NEUROLOGIC: Cranial nerves II-XII are intact. Alert, oriented, answers questions " appropriately.  INTEGUMENTARY: No rashes or jaundice.         LABS:   Latest Reference Range & Units 22 12:25   Sodium 136 - 145 mmol/L 141   Potassium 3.4 - 5.3 mmol/L 4.7   Chloride 98 - 107 mmol/L 105   Carbon Dioxide (CO2) 22 - 29 mmol/L 23   Urea Nitrogen 8.0 - 23.0 mg/dL 35.1 (H)   Creatinine 0.51 - 0.95 mg/dL 1.07 (H)   GFR Estimate >60 mL/min/1.73m2 49 (L)   Calcium 8.2 - 9.6 mg/dL 9.6   Anion Gap 7 - 15 mmol/L 13   Albumin 3.5 - 5.2 g/dL 4.1   Protein Total 6.4 - 8.3 g/dL 6.6   Alkaline Phosphatase 35 - 104 U/L 103   ALT 10 - 35 U/L 11   AST 10 - 35 U/L 18   Bilirubin Total <=1.2 mg/dL 0.3   Glucose 70 - 99 mg/dL 104 (H)   WBC 4.0 - 11.0 10e3/uL 7.3   Hemoglobin 11.7 - 15.7 g/dL 12.1   Hematocrit 35.0 - 47.0 % 38.3   Platelet Count 150 - 450 10e3/uL 277   RBC Count 3.80 - 5.20 10e6/uL 4.06   MCV 78 - 100 fL 94   MCH 26.5 - 33.0 pg 29.8   MCHC 31.5 - 36.5 g/dL 31.6   RDW 10.0 - 15.0 % 12.5   % Neutrophils % 55   % Lymphocytes % 29   % Monocytes % 10   % Eosinophils % 5   % Basophils % 1   Absolute Basophils 0.0 - 0.2 10e3/uL 0.1   Absolute Eosinophils 0.0 - 0.7 10e3/uL 0.4   Absolute Immature Granulocytes <=0.4 10e3/uL 0.0   Absolute Lymphocytes 0.8 - 5.3 10e3/uL 2.1   Absolute Monocytes 0.0 - 1.3 10e3/uL 0.8   % Immature Granulocytes % 0   Absolute Neutrophils 1.6 - 8.3 10e3/uL 4.0   Absolute NRBCs 10e3/uL 0.0   NRBCs per 100 WBC <1 /100 0           PATHOLOGY:  Surgical pathology                        Surgical Pathology Report     Patient Name: GERARDO HEATH                    Accession #: [id number]   Med: Rec.: [id number]                               Phone Number: [phone]   : 1932 (Age: 89)                          Gender: F   Client: 10 - [facility]              Location: [facility] ([facility])                                                     Taken: 2021                                                     Received: 2021                                                      Reported: 12/23/2021   Physician(s): GUERA BASS     ========================================================================   CLINICAL HISTORY   Malignant neoplasm of overlapping sites of bladder     OPERATIVE DIAGNOSES       Operation/Specimen: A: Urinary bladder, TURBT left anterior neck TUMOR       PATHOLOGICAL DIAGNOSIS:   A.  Urinary bladder, TURBT left anterior neck TUMOR:    . 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 See   comment    . Tumor invades into muscularis propria.     COMMENT   Sections show poorly differentiated carcinoma with basaloid features. No   definitive malignant surface component is present. Differential diagnosis   includes; urothelial carcinoma with basaloid features and basaloid squamous   cell carcinoma. It is hard to distinguish between these two differential   diagnosis based on morphologic and immunohistochemical features. Although we   favor that, this represent a bladder primary tumor, the possibility of   secondary bladder involvement by tumors from other organs; mainly gynecologic   tract, should be clinically ruled out.              Final Diagnosis   Urine, cytology:                 Interpretation:                  Atypical Urothelial Cells.  See comment.                 Adequacy:                 Satisfactory for evaluation.         IMAGING:  CT Urogram 1/10/22:  IMPRESSION:  1. No B/L hydroureteronephrosis. Mild thickening of the wall of the urinary bladder, a nonspecific finding. No convincing evidence of metastatic disease in the abdomen/pelvis.  2. Sub-6 mm low-attenuation left renal lesion, progressed in size from CT 5/19/15. This is too small to characterize, statistically represents cyst.  3. Stable additional incidental findings.      CT CAP 5/4/22:                                                                 IMPRESSION:  1.  Mosaic attenuation pattern of the lungs is nonspecific but may  indicate small vessel or small airways  disease.  2.  There are a few scattered pulmonary nodules measuring less than 4  mm. No prior chest CT available for comparison. Attention at  follow-up.  3.  No evidence of metastatic malignancy in the abdomen or pelvis.    CT CAP 12/8/22:  IMPRESSION:   1.  No significant interval change compared to 5/4/2022.  2.  Few small pulmonary nodules in both lungs measure 0.5 cm or  smaller, and are unchanged.  3.  Mosaic attenuation throughout both lungs is unchanged, and may be  related to air trapping.  4.  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       ASSESSMENT/PLAN:  Shania Salazar is an 89 year old female who is referred for muscle invasive bladder cancer. Past medical history is significant for HTN, HLD, GERD, cognitive decline, and arthritis.     1) Muscle-invasive urothelial carcinoma  -Originally diagnosed in November 2020 (non-muscle invasive, but high-risk) s/p BCG x6.  -Recurrent, muscle-invasive disease diagnosed in November-December 2021.  -Patient has been deemed a poor surgical candidate for cystectomy, to which I am in agreement. Furthermore, patient and daughter would elect against cystectomy.  -We discussed superior locoregional DFS (absolute difference of 12% at 2 years favoring chemoRT). The 5 year overall survival rates are approximately 48% with chemoRT and 35% with radiation alone (N Engl J Med 2012; 366:8740-8528 DOI: 10.1056/[id number]).  -We reviewed treatment with mitomycin and fluorouracil:     28-45 day cycle for 1 cycle with concurrent RT  -Mitomycin 12 mg/m2 IV Day 1  -Fluorouracil 500 mg/m2 IV continuous over 24 hours Days 1-5 and 22-26     -Patient and daughter inform me they have come to the conclusion they will not move forward with systemic chemotherapy. They have significant reservation over toxicity and prize quality of life given the fact that patient is still quite active with crocheting and gardening. I discussed this regimen is typically  well-tolerated, but side effects due include marrow suppression with increased risk of neutropenia/infection, blood transfusion, diarrhea, nausea/vomiting, cardiac events.   -Initially, patient stating she would not be willing to move forward with radiation alone, but I have asked if she would be willing to reschedule with Radiation Oncology as she has not had evaluation to make a fully informed decision. Patient continues to be open to discussion with Radiation Oncology. She canceled her visit previously as this was set for downwn. Referral placed for MRO.  -5/25/22: Patient did not end up meeting with MRO. She and daughter have discussed again and patient does not wish to proceed with chemoradiation or radiation at this time valuing her quality of life. She is to turn 89 in three weeks and a large birthday gathering is planned. They are open to meeting with palliative care at this time to assist in advanced care planning. Patient is asymptomatic at this time, but is likely to develop pain and fatigue as her underlying malignancy progresses. Patient may require follow up with Urology. They also wish to speak with social work in regards to supplies.  -Patient and daughter met with hospice. In review of records, it appears time was limited and they were not able to discusse advanced care planning.   -12/15/22: CBC stable. She continues to have CKD. CT CAP is showing stable B/L pulmonary nodules <0.5 cm. No new mass/LAD. No further evidence of disease in the A/P. Patient is soon to remeet with palliative care in January 2023.      2) Patient confirms she does not wish to proceed with systemic treatment at this time. Can monitor with labs and imaging every 6 months to monitor for progression. I discussed she is not hospice appropriate at this time, but will require services in the future with progression as she will not be agreeable to treatment. I had discussed if she has hematuria in the future, will need urology  evaluation.    3) Follow up in 6 months with me in clinic with repeat labs and imaging.        Florina Edgar DO  Hematology/Oncology  [facility]          Again, thank you for allowing me to participate in the care of your patient.        Sincerely,        Florina Edgar DO

## 2022-12-15 NOTE — PROGRESS NOTES
"[facility]    Hematology/Oncology Established Patient Follow-up Note    Treatment Summary:      Today's Date: 12/15/22    Reason for Follow-up: Muscle invasive bladder cancer  Referring Provider: Trinity Sullivna MD      HISTORY OF PRESENT ILLNESS: Christiana Salazar is a 89 year old female who is referred for muscle invasive bladder cancer. Past medical history is significant for HTN, HLD, GERD, cognitive decline, and arthritis.     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 Donovan at [place]. She received induction BCG x 6 per her daughter who accompanies her today with complete response but no maintenance treatment was offered. Surveillance cystoscopy in November of 2021 showed evidence of recurrence for which she underwent TURBT on 12/17/2021 by Dr. Orlin Humphrey:      \"TURBT checklist  1) Number of tumors: 1  2) Size of largest tumor: 2 cm  3) Characteristic of tumor: papillary with some sloughing debris, superficial  4) Recurrent vs. Primary: primary  5) Presence of CIS: No  6) Bimanual exam under anesthesia: no  7) Visually complete resection: yes  9) Visualization of detrusor muscle in resection base: no  10) Visual evaluation of perforation: no\"     Pathology revealed poorly differentiated tumor with basaloid features with extensive involvement of detrusor muscle. She had a restaging CT urogram done on 1/4/2022 with no clear evidence of metastatic disease.      Patient lives in her own apartment at a nursing care facility. She is able to perform most ADLs without significant issue. She will meet with Rad Onc in early May 2022. She denies weight loss, fever, or evidence of hematuria.    Referred to Radiology downtown. Patient and daughter stated they arrived in the parking structure and decided against meeting with the team as they were overwhelmed and did " not want to receive therapy downtown. No complaints of hematuria currently. No pain, weight loss.    Patient and daughter again discussed and she did not end up meeting with radiation oncology with rescheduling to [facility] [place].     Patient met with palliative care. She has not had follow up with urology. She confirms she still does not wish to proceed with systemic chemotherapy or radiation.      INTERIM HISTORY:  No pain or hematuria currently. Daughter states dementia is worsening. Patient has been enjoying watching gameshows as she cannot garden. She is still crocheting. Weight is stable. Good PO intake.       REVIEW OF SYSTEMS:   A 14 point ROS was reviewed with pertinent positives and negatives in the HPI.       HOME MEDICATIONS:  Current Outpatient Medications   Medication Sig Dispense Refill     albuterol (PROVENTIL) (2.5 MG/3ML) 0.083% neb solution Inhale 2.5 mg into the lungs       Docusate Sodium (COLACE PO) Take by mouth 2 times daily       fluticasone (FLONASE) 50 MCG/ACT nasal spray 1 spray       furosemide (LASIX) 40 MG tablet Take 20 mg by mouth every other day       HYDROcodone-acetaminophen (NORCO) 5-325 MG tablet Take 1 tablet by mouth 2 times daily. May also take 1 tablet daily as needed for severe pain (7-10). 90 tablet 0     lisinopril (ZESTRIL) 20 MG tablet lisinopril 20 mg tablet       methylPREDNISolone (MEDROL) 32 MG tablet Take 1 tablet (32mg) 12 hours and 2 hours prior to CT scan. 2 tablet 0     Multiple Vitamin (MULTI-VITAMINS) TABS Take 1 tablet by mouth daily       oxybutynin ER (DITROPAN-XL) 10 MG 24 hr tablet Take 10 mg by mouth 2 times daily       potassium aminobenzoate 500 MG CAPS capsule Twice a week       simvastatin (ZOCOR) 40 MG tablet At Bedtime       traZODone (DESYREL) 50 MG tablet trazodone 50 mg tablet           ALLERGIES:  Allergies   Allergen Reactions     Contrast Dye Anaphylaxis     Diagnostic X-Ray Materials Shortness Of Breath     Naproxen Rash     Face broke out in a rash    "    Iodine      Seasonal Allergies Other (See Comments)     Sinus drainage         PAST MEDICAL HISTORY:  No past medical history on file.      PAST SURGICAL HISTORY:  No past surgical history on file.      SOCIAL HISTORY:  Social History     Socioeconomic History     Marital status:      Spouse name: Not on file     Number of children: Not on file     Years of education: Not on file     Highest education level: Not on file   Occupational History     Not on file   Tobacco Use     Smoking status: Never     Smokeless tobacco: Never   Substance and Sexual Activity     Alcohol use: Not Currently     Drug use: Not on file     Sexual activity: Not on file   Other Topics Concern     Not on file   Social History Narrative     Not on file     Social Determinants of Health     Financial Resource Strain: Not on file   Food Insecurity: Not on file   Transportation Needs: Not on file   Physical Activity: Not on file   Stress: Not on file   Social Connections: Not on file   Intimate Partner Violence: Not on file   Housing Stability: Not on file         FAMILY HISTORY:  No family history on file.      PHYSICAL EXAM:  Vital signs:  /54   Pulse 76   Temp 97.2  F (36.2  C) (Tympanic)   Resp 16   Ht 1.588 m (5' 2.5\")   Wt 83.1 kg (183 lb 4.8 oz)   SpO2 97%   BMI 32.99 kg/m     ECO  GENERAL/CONSTITUTIONAL: No acute distress. Chronically ill appearing.   RESPIRATORY: Clear to auscultation bilaterally. No crackles or wheezing.   CARDIOVASCULAR: Regular rate and rhythm without murmurs, gallops, or rubs.  MUSCULOSKELETAL: Warm and well-perfused, no cyanosis, clubbing, or edema.  NEUROLOGIC: Cranial nerves II-XII are intact. Alert, oriented, answers questions appropriately.  INTEGUMENTARY: No rashes or jaundice.         LABS:   Latest Reference Range & Units 22 12:25   Sodium 136 - 145 mmol/L 141   Potassium 3.4 - 5.3 mmol/L 4.7   Chloride 98 - 107 mmol/L 105   Carbon Dioxide (CO2) 22 - 29 mmol/L 23   Urea " Nitrogen 8.0 - 23.0 mg/dL 35.1 (H)   Creatinine 0.51 - 0.95 mg/dL 1.07 (H)   GFR Estimate >60 mL/min/1.73m2 49 (L)   Calcium 8.2 - 9.6 mg/dL 9.6   Anion Gap 7 - 15 mmol/L 13   Albumin 3.5 - 5.2 g/dL 4.1   Protein Total 6.4 - 8.3 g/dL 6.6   Alkaline Phosphatase 35 - 104 U/L 103   ALT 10 - 35 U/L 11   AST 10 - 35 U/L 18   Bilirubin Total <=1.2 mg/dL 0.3   Glucose 70 - 99 mg/dL 104 (H)   WBC 4.0 - 11.0 10e3/uL 7.3   Hemoglobin 11.7 - 15.7 g/dL 12.1   Hematocrit 35.0 - 47.0 % 38.3   Platelet Count 150 - 450 10e3/uL 277   RBC Count 3.80 - 5.20 10e6/uL 4.06   MCV 78 - 100 fL 94   MCH 26.5 - 33.0 pg 29.8   MCHC 31.5 - 36.5 g/dL 31.6   RDW 10.0 - 15.0 % 12.5   % Neutrophils % 55   % Lymphocytes % 29   % Monocytes % 10   % Eosinophils % 5   % Basophils % 1   Absolute Basophils 0.0 - 0.2 10e3/uL 0.1   Absolute Eosinophils 0.0 - 0.7 10e3/uL 0.4   Absolute Immature Granulocytes <=0.4 10e3/uL 0.0   Absolute Lymphocytes 0.8 - 5.3 10e3/uL 2.1   Absolute Monocytes 0.0 - 1.3 10e3/uL 0.8   % Immature Granulocytes % 0   Absolute Neutrophils 1.6 - 8.3 10e3/uL 4.0   Absolute NRBCs 10e3/uL 0.0   NRBCs per 100 WBC <1 /100 0           PATHOLOGY:  Surgical pathology                        Surgical Pathology Report     Patient Name: GERARDO HEATH                    Accession #: [id number]   Med: Rec.: [id number]                               Phone Number: [phone]   : 1932 (Age: 89)                          Gender: F   Client: 10  Ол[employer]              Location: [facility])                                                     Taken: 2021                                                     Received: 2021                                                     Reported: 2021   Physician(s): GUERA BASS     ========================================================================   CLINICAL HISTORY   Malignant neoplasm of overlapping sites of bladder     OPERATIVE DIAGNOSES        Operation/Specimen: A: Urinary bladder, TURBT left anterior neck TUMOR       PATHOLOGICAL DIAGNOSIS:   A.  Urinary bladder, TURBT left anterior neck TUMOR:    . 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 See   comment    . Tumor invades into muscularis propria.     COMMENT   Sections show poorly differentiated carcinoma with basaloid features. No   definitive malignant surface component is present. Differential diagnosis   includes; urothelial carcinoma with basaloid features and basaloid squamous   cell carcinoma. It is hard to distinguish between these two differential   diagnosis based on morphologic and immunohistochemical features. Although we   favor that, this represent a bladder primary tumor, the possibility of   secondary bladder involvement by tumors from other organs; mainly gynecologic   tract, should be clinically ruled out.              Final Diagnosis   Urine, cytology:                 Interpretation:                  Atypical Urothelial Cells.  See comment.                 Adequacy:                 Satisfactory for evaluation.         IMAGING:  CT Urogram 1/10/22:  IMPRESSION:  1. No B/L hydroureteronephrosis. Mild thickening of the wall of the urinary bladder, a nonspecific finding. No convincing evidence of metastatic disease in the abdomen/pelvis.  2. Sub-6 mm low-attenuation left renal lesion, progressed in size from CT 5/19/15. This is too small to characterize, statistically represents cyst.  3. Stable additional incidental findings.      CT CAP 5/4/22:                                                                 IMPRESSION:  1.  Mosaic attenuation pattern of the lungs is nonspecific but may  indicate small vessel or small airways disease.  2.  There are a few scattered pulmonary nodules measuring less than 4  mm. No prior chest CT available for comparison. Attention at  follow-up.  3.  No evidence of metastatic malignancy in the abdomen or pelvis.    CT CAP  12/8/22:  IMPRESSION:   1.  No significant interval change compared to 5/4/2022.  2.  Few small pulmonary nodules in both lungs measure 0.5 cm or  smaller, and are unchanged.  3.  Mosaic attenuation throughout both lungs is unchanged, and may be  related to air trapping.  4.  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       ASSESSMENT/PLAN:  Shania Salazar is an 89 year old female who is referred for muscle invasive bladder cancer. Past medical history is significant for HTN, HLD, GERD, cognitive decline, and arthritis.     1) Muscle-invasive urothelial carcinoma  -Originally diagnosed in November 2020 (non-muscle invasive, but high-risk) s/p BCG x6.  -Recurrent, muscle-invasive disease diagnosed in November-December 2021.  -Patient has been deemed a poor surgical candidate for cystectomy, to which I am in agreement. Furthermore, patient and daughter would elect against cystectomy.  -We discussed superior locoregional DFS (absolute difference of 12% at 2 years favoring chemoRT). The 5 year overall survival rates are approximately 48% with chemoRT and 35% with radiation alone (N Engl J Med 2012; 366:0243-8634 DOI: 10.1056/FCUDcp5791424).  -We reviewed treatment with mitomycin and fluorouracil:     28-45 day cycle for 1 cycle with concurrent RT  -Mitomycin 12 mg/m2 IV Day 1  -Fluorouracil 500 mg/m2 IV continuous over 24 hours Days 1-5 and 22-26     -Patient and daughter inform me they have come to the conclusion they will not move forward with systemic chemotherapy. They have significant reservation over toxicity and prize quality of life given the fact that patient is still quite active with crocheting and gardening. I discussed this regimen is typically well-tolerated, but side effects due include marrow suppression with increased risk of neutropenia/infection, blood transfusion, diarrhea, nausea/vomiting, cardiac events.   -Initially, patient stating she would not be willing to move  forward with radiation alone, but I have asked if she would be willing to reschedule with Radiation Oncology as she has not had evaluation to make a fully informed decision. Patient continues to be open to discussion with Radiation Oncology. She canceled her visit previously as this was set for downwn. Referral placed for [facility].  -5/25/22: Patient did not end up meeting with MRO. She and daughter have discussed again and patient does not wish to proceed with chemoradiation or radiation at this time valuing her quality of life. She is to turn 89 in three weeks and a large birthday gathering is planned. They are open to meeting with palliative care at this time to assist in advanced care planning. Patient is asymptomatic at this time, but is likely to develop pain and fatigue as her underlying malignancy progresses. Patient may require follow up with Urology. They also wish to speak with social work in regards to supplies.  -Patient and daughter met with hospice. In review of records, it appears time was limited and they were not able to discusse advanced care planning.   -12/15/22: CBC stable. She continues to have CKD. CT CAP is showing stable B/L pulmonary nodules <0.5 cm. No new mass/LAD. No further evidence of disease in the A/P. Patient is soon to remeet with palliative care in January 2023.      2) Patient confirms she does not wish to proceed with systemic treatment at this time. Can monitor with labs and imaging every 6 months to monitor for progression. I discussed she is not hospice appropriate at this time, but will require services in the future with progression as she will not be agreeable to treatment. I had discussed if she has hematuria in the future, will need urology evaluation.    3) Follow up in 6 months with me in clinic with repeat labs and imaging.        Florina Edgar DO  Hematology/Oncology  [facility]

## 2023-01-06 ENCOUNTER — PATIENT OUTREACH (OUTPATIENT)
Dept: PALLIATIVE CARE | Facility: CLINIC | Age: 88
End: 2023-01-06

## 2023-01-06 DIAGNOSIS — G89.3 CANCER RELATED PAIN: ICD-10-CM

## 2023-01-07 NOTE — PROGRESS NOTES
[facility]: Palliative Care                                                                                        Fax request for Norco    Last date written and prescribing provider:  12/14/2022 Mckayla  Last office visit: 8/18/2022  Next office visit:  2/9/2023 Dr Randall Kohli     reviewed with no concerns  Last sold per :  Norco sold on 12/15/2022    Christy Mao LPN  Palliative  Care Coordination  [phone]

## 2023-01-09 RX ORDER — HYDROCODONE BITARTRATE AND ACETAMINOPHEN 5; 325 MG/1; MG/1
TABLET ORAL
Qty: 90 TABLET | Refills: 0 | Status: SHIPPED | OUTPATIENT
Start: 2023-01-12 | End: 2023-02-07

## 2023-02-06 DIAGNOSIS — G89.3 CANCER RELATED PAIN: ICD-10-CM

## 2023-02-07 RX ORDER — HYDROCODONE BITARTRATE AND ACETAMINOPHEN 5; 325 MG/1; MG/1
TABLET ORAL
Qty: 60 TABLET | Refills: 0 | Status: SHIPPED | OUTPATIENT
Start: 2023-02-07 | End: 2023-02-21

## 2023-02-07 NOTE — TELEPHONE ENCOUNTER
Narcotic Refill Request    Medication(s) requested:  Hydrocodone-acetaminophen 5-325mg tablet  Person Requesting Refill: pt/daughter, Rachelle  What pain is the medication treating: cancer pain  How is the medication being taken?: 2-3 tabs/day  Does pt have enough for today? Yes, but unsure of amount as KAPIL is now managing medications and Rachelle was not present at facility.  Is pain being adequately controlled on the current regimen?: yes  Experiencing any side effects from medication?: no    Date of most recent appointment:  12/15/22 w/ Florina Edgar  Next appointment:   3/9/23 w/ Dr. Boggs   Last fill date and by whom:  Dr. Yaritza Boggs on 1/9/23   Reviewed: no    Routed provider: Routed to Dr. Boggs.

## 2023-02-20 DIAGNOSIS — G89.3 CANCER RELATED PAIN: ICD-10-CM

## 2023-02-21 RX ORDER — HYDROCODONE BITARTRATE AND ACETAMINOPHEN 5; 325 MG/1; MG/1
TABLET ORAL
Qty: 60 TABLET | Refills: 0 | Status: SHIPPED | OUTPATIENT
Start: 2023-03-08 | End: 2023-04-04

## 2023-02-21 NOTE — TELEPHONE ENCOUNTER
Received MyChart message from pharmacy requesting refill of Norco.     Last refill: 2/8/23  Last office visit: 8/18/23  Scheduled for follow up 3/9/23     Will route request to MD for review.     Reviewed [place]  Report.

## 2023-03-07 NOTE — PROGRESS NOTES
Palliative Care Progress Note    Patient Name: Christiana Salazar  Primary Provider: Mery Vizcarra    Chief Complaint/Patient ID: 89 yo F with Hx of muscle invasive bladder cancer. Dx 2020, s/p induction BCG x6 with CR and no maintenance tx offered. Evidence of recurrence 2021, s/p TURBT with no evidence of metastatic dz. She ultimately elected to not pursue systemic chemo or radiation therapy. Additional Past medical history is significant for HTN, HLD, GERD, cognitive decline, and arthritis (longstanding hx of chronic LBP with hx of injections).    Last Palliative care appointment: 22 with me.      Reviewed: Yes    ORT Score: 0     Interim History:  Christiana Salazar is a 89 year old female who is seen today for follow up with Palliative Care. She is here today with her daughter Rachelle who provides additional history.     Overall has been stable since her last visit in August.  Continues to have aching in her right leg all of the time, and she has to keep moving around.  Struggles at night in particular.  Continues taking Norco twice daily as well as 1 additional Tylenol dose.  Her daughter generally administers the Tylenol, and she now has all of her medications administered by at her facility.  It sounds like she has some topical agents as well, though her use of these is inconsistent to her leg/hip.    Her handicap placard is set to  at the end of the month, so she requested a renewal today.     Social History:  Moved to [place] from [place].  Had 5 children, however unfortunately lost 2 of her sons suddenly within 6 months of each other in .  Currently living at the [facility].  Was raised [employer] and was very involved in the [employer].  Social History     Tobacco Use     Smoking status: Never     Smokeless tobacco: Never   Substance Use Topics     Alcohol use: Not Currently     Family History- Reviewed in Epic.    Allergies   Allergen Reactions     Contrast Dye Anaphylaxis      Diagnostic X-Ray Materials Shortness Of Breath     Naproxen Rash     Face broke out in a rash       Iodine      Seasonal Allergies Other (See Comments)     Sinus drainage     Advanced Care Planning: There is a legal power of  document on file naming daughter. No HCD currently available to us. Provided into 3/9/23.     Medications- Reviewed in Epic.    Past Medical History- Reviewed in [facility].    Past Surgical History- Reviewed in Epic.    Review of Systems:   ROS: 10 point ROS neg other than the symptoms noted above in the HPI.      Physical Exam:   /77   Pulse 81   Resp 16   Wt 86.2 kg (190 lb)   SpO2 100%   BMI 34.20 kg/m    Constitutional: Alert, pleasant, no apparent distress. Sitting up in chair.  Eyes: Sclera non-icteric, no eye discharge.  ENT: No nasal discharge. Ears grossly normal.  Respiratory: Unlabored respirations. Speaking in full sentences.  Musculoskeletal: Extremities appear normal- no gross deformities noted. Tenderness to palpation over right IT band from greater trochanter down to lateral knee. Palpable round nodule in belly of right tensor fascia kayy.  Skin: No suspicious lesions or rashes on visible skin.  Neurologic: Clear speech, no aphasia. No facial droop.  Psychiatric: Mentation appears normal, appropriate attention. Affect normal/bright. Does not appear anxious or depressed.      Key Data Reviewed:  LABS: 12/8/22- Cr 1.07, GFR 49, Albumin 4.1, LFTs wnl. WBC 7.3, Hgb 12.1, Plts 277.     IMAGING: CT CAP 12/8/22 - IMPRESSION:   1.  No significant interval change compared to 5/4/2022.  2.  Few small pulmonary nodules in both lungs measure 0.5 cm or smaller, and are unchanged.  3.  Mosaic attenuation throughout both lungs is unchanged, and may be related to air trapping.  4.  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    Impression & Recommendations & Counseling:  Christiana Salazar is a 89 year old female with history of muscle invasive bladder  "cancer.    Back Pain - Chronic back pain stable.  -Ok to continue Norco 1 tablet BID and additional tylenol during the day.  -Continue topical NSAID such as aspercream up to 4x daily.    Leg pain - Based on symptoms and exam, I think she is having right sided IT band syndrome, however, could also be some bursitis.  -Will trial regular heat and topical NSAID as well as massage to her lateral thigh.  -Also discussed referral to PT for IT band- they will think about this.  -Could consider referral to sports medicine in the future if neither of the above are helpful. If it is bursitis, question if a steroid injection might be helpful.    Bladder cancer  Goals of care - Most recent scans showed no evidence of recurrence or metastatic disease. Patient and daughter were glad this was the case. She has said that if it were to recur, she would not want to go through additional systemic therapy due to the risk of affecting her quality of life.    She has completed healthcare directive paperwork for [place] but has not updated it since moving to [place]. She thinks she would want CPR attempted right now \"if there was a chance it would be helpful\". She has told her family she would not want life-prolonging measures if she was not going to recover.  -Provided blank copy of healthcare directive for her to update in [place].  -Also provided them a blank copy of a POLST form to review- discussed that if she decided in the future she would not want CPR/intbubation, I would recommend completing this form.    I completed the paperwork to recertify her for a handicap placard today as well.    Follow up: 3-4 months in person at [facility]       Total time spent on day of encounter is 53 mins, including reviewing record, review of above studies, above visit with patient (FTF 1:53PM-2:25PM), symptomatic discussion of different types of pain and her bladder cancer, including medication adjustments/prescription management, and documentation. "     Yaritza Boggs,   Palliative Medicine   Pager [phone], [facility] ID [id number]    Some chart documentation performed using Dragon Voice recognition Software. Although reviewed after completion, some words and grammatical errors may remain.

## 2023-03-09 ENCOUNTER — ONCOLOGY VISIT (OUTPATIENT)
Dept: ONCOLOGY | Facility: CLINIC | Age: 88
End: 2023-03-09
Attending: STUDENT IN AN ORGANIZED HEALTH CARE EDUCATION/TRAINING PROGRAM
Payer: MEDICARE

## 2023-03-09 VITALS
OXYGEN SATURATION: 100 % | WEIGHT: 190 LBS | HEART RATE: 81 BPM | BODY MASS INDEX: 34.2 KG/M2 | RESPIRATION RATE: 16 BRPM | SYSTOLIC BLOOD PRESSURE: 124 MMHG | DIASTOLIC BLOOD PRESSURE: 77 MMHG

## 2023-03-09 DIAGNOSIS — G89.29 CHRONIC MIDLINE LOW BACK PAIN WITHOUT SCIATICA: ICD-10-CM

## 2023-03-09 DIAGNOSIS — Z71.89 GOALS OF CARE, COUNSELING/DISCUSSION: ICD-10-CM

## 2023-03-09 DIAGNOSIS — M54.50 CHRONIC MIDLINE LOW BACK PAIN WITHOUT SCIATICA: ICD-10-CM

## 2023-03-09 DIAGNOSIS — M79.651 PAIN OF RIGHT THIGH: ICD-10-CM

## 2023-03-09 DIAGNOSIS — C67.9 MALIGNANT NEOPLASM OF URINARY BLADDER, UNSPECIFIED SITE (H): Primary | ICD-10-CM

## 2023-03-09 DIAGNOSIS — Z51.5 ENCOUNTER FOR PALLIATIVE CARE: ICD-10-CM

## 2023-03-09 DIAGNOSIS — M19.90 ARTHRITIS: ICD-10-CM

## 2023-03-09 PROCEDURE — 99215 OFFICE O/P EST HI 40 MIN: CPT | Performed by: STUDENT IN AN ORGANIZED HEALTH CARE EDUCATION/TRAINING PROGRAM

## 2023-03-09 PROCEDURE — G0463 HOSPITAL OUTPT CLINIC VISIT: HCPCS | Performed by: STUDENT IN AN ORGANIZED HEALTH CARE EDUCATION/TRAINING PROGRAM

## 2023-03-09 ASSESSMENT — PAIN SCALES - GENERAL: PAINLEVEL: WORST PAIN (10)

## 2023-03-09 NOTE — LETTER
3/9/2023         RE: Christiana Salazar  [address] [place] [address]        Dear Colleague,    Thank you for referring your patient, Christiana Salazar, to the [facility]. Please see a copy of my visit note below.    Palliative Care Progress Note    Patient Name: Christiana Salazar  Primary Provider: Mery Vizcarra    Chief Complaint/Patient ID: 89 yo F with Hx of muscle invasive bladder cancer. Dx 2020, s/p induction BCG x6 with CR and no maintenance tx offered. Evidence of recurrence 2021, s/p TURBT with no evidence of metastatic dz. She ultimately elected to not pursue systemic chemo or radiation therapy. Additional Past medical history is significant for HTN, HLD, GERD, cognitive decline, and arthritis (longstanding hx of chronic LBP with hx of injections).    Last Palliative care appointment: 22 with me.      Reviewed: Yes    ORT Score: 0     Interim History:  Christiana Salazar is a 89 year old female who is seen today for follow up with Palliative Care. She is here today with her daughter Rachelle who provides additional history.     Overall has been stable since her last visit in August.  Continues to have aching in her right leg all of the time, and she has to keep moving around.  Struggles at night in particular.  Continues taking Norco twice daily as well as 1 additional Tylenol dose.  Her daughter generally administers the Tylenol, and she now has all of her medications administered by at her facility.  It sounds like she has some topical agents as well, though her use of these is inconsistent to her leg/hip.    Her handicap placard is set to  at the end of the month, so she requested a renewal today.     Social History:  Moved to [place] from [place].  Had 5 children, however unfortunately lost 2 of her sons suddenly within 6 months of each other in .  Currently living at the [facility].  Was raised [employer] and was very involved in the  [employer] [facility].  Social History     Tobacco Use     Smoking status: Never     Smokeless tobacco: Never   Substance Use Topics     Alcohol use: Not Currently     Family History- Reviewed in Epic.    Allergies   Allergen Reactions     Contrast Dye Anaphylaxis     Diagnostic X-[employer] Shortness Of Breath     Naproxen Rash     Face broke out in a rash       Iodine      Seasonal Allergies Other (See Comments)     Sinus drainage     Advanced Care Planning: There is a legal power of  document on file naming daughter. No HCD currently available to us. Provided into 3/9/23.     Medications- Reviewed in Epic.    Past Medical History- Reviewed in [facility].    Past Surgical History- Reviewed in Epic.    Review of Systems:   ROS: 10 point ROS neg other than the symptoms noted above in the HPI.      Physical Exam:   /77   Pulse 81   Resp 16   Wt 86.2 kg (190 lb)   SpO2 100%   BMI 34.20 kg/m    Constitutional: Alert, pleasant, no apparent distress. Sitting up in chair.  Eyes: Sclera non-icteric, no eye discharge.  ENT: No nasal discharge. Ears grossly normal.  Respiratory: Unlabored respirations. Speaking in full sentences.  Musculoskeletal: Extremities appear normal- no gross deformities noted. Tenderness to palpation over right IT band from greater trochanter down to lateral knee. Palpable round nodule in belly of right tensor fascia kayy.  Skin: No suspicious lesions or rashes on visible skin.  Neurologic: Clear speech, no aphasia. No facial droop.  Psychiatric: Mentation appears normal, appropriate attention. Affect normal/bright. Does not appear anxious or depressed.      Key Data Reviewed:  LABS: 12/8/22- Cr 1.07, GFR 49, Albumin 4.1, LFTs wnl. WBC 7.3, Hgb 12.1, Plts 277.     IMAGING: CT CAP 12/8/22 - IMPRESSION:   1.  No significant interval change compared to 5/4/2022.  2.  Few small pulmonary nodules in both lungs measure 0.5 cm or smaller, and are unchanged.  3.  Mosaic attenuation throughout both lungs is  "unchanged, and may be related to air trapping.  4.  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    Impression & Recommendations & Counseling:  Christiana Salazar is a 89 year old female with history of muscle invasive bladder cancer.    Back Pain - Chronic back pain stable.  -Ok to continue Norco 1 tablet BID and additional tylenol during the day.  -Continue topical NSAID such as aspercream up to 4x daily.    Leg pain - Based on symptoms and exam, I think she is having right sided IT band syndrome, however, could also be some bursitis.  -Will trial regular heat and topical NSAID as well as massage to her lateral thigh.  -Also discussed referral to PT for IT band- they will think about this.  -Could consider referral to sports medicine in the future if neither of the above are helpful. If it is bursitis, question if a steroid injection might be helpful.    Bladder cancer  Goals of care - Most recent scans showed no evidence of recurrence or metastatic disease. Patient and daughter were glad this was the case. She has said that if it were to recur, she would not want to go through additional systemic therapy due to the risk of affecting her quality of life.    She has completed healthcare directive paperwork for [place] but has not updated it since moving to [place]. She thinks she would want CPR attempted right now \"if there was a chance it would be helpful\". She has told her family she would not want life-prolonging measures if she was not going to recover.  -Provided blank copy of healthcare directive for her to update in [place].  -Also provided them a blank copy of a POLST form to review- discussed that if she decided in the future she would not want CPR/intbubation, I would recommend completing this form.    I completed the paperwork to recertify her for a handicap placard today as well.    Follow up: 3-4 months in [facility] at [facility]       Total time spent on day of encounter is 53 mins, " "including reviewing record, review of above studies, above visit with patient (FTF 1:53PM-2:25PM), symptomatic discussion of different types of pain and her bladder cancer, including medication adjustments/prescription management, and documentation.     Yaritza Boggs DO  Palliative Medicine   Pager [phone], AMCOM ID [id number]    Some chart documentation performed using Dragon Voice recognition Software. Although reviewed after completion, some words and grammatical errors may remain.      Oncology Rooming Note    March 9, 2023 1:49 PM   Christiana Salazar is a 89 year old female who presents for:    Chief Complaint   Patient presents with     Oncology Clinic Visit     Initial Vitals: /77   Pulse 81   Resp 16   Wt 86.2 kg (190 lb)   SpO2 100%   BMI 34.20 kg/m   Estimated body mass index is 34.2 kg/m  as calculated from the following:    Height as of 12/15/22: 1.588 m (5' 2.5\").    Weight as of this encounter: 86.2 kg (190 lb). Body surface area is 1.95 meters squared.  Worst Pain (10) Comment: Data Unavailable   No LMP recorded.  Allergies reviewed: Yes  Medications reviewed: Yes    Medications: Medication refills not needed today.  Pharmacy name entered into [employer]:    [id number] [place] [facility] [place] [id number]  [facility] [address]    Clinical concerns:  doctor was notified.      Laurence Cochran [facility]                Again, thank you for allowing me to participate in the care of your patient.        Sincerely,        Yaritza Boggs DO    "

## 2023-03-09 NOTE — PROGRESS NOTES
"Oncology Rooming Note    March 9, 2023 1:49 PM   Christiana Salazar is a 89 year old female who presents for:    Chief Complaint   Patient presents with     Oncology Clinic Visit     Initial Vitals: /77   Pulse 81   Resp 16   Wt 86.2 kg (190 lb)   SpO2 100%   BMI 34.20 kg/m   Estimated body mass index is 34.2 kg/m  as calculated from the following:    Height as of 12/15/22: 1.588 m (5' 2.5\").    Weight as of this encounter: 86.2 kg (190 lb). Body surface area is 1.95 meters squared.  Worst Pain (10) Comment: Data Unavailable   No LMP recorded.  Allergies reviewed: Yes  Medications reviewed: Yes    Medications: Medication refills not needed today.  Pharmacy name entered into [employer]:    [id number] [place] [facility] [place] [address] [address]  [facility] [address] [facility]    Clinical concerns:  doctor was notified.      Laurence Cochran CMA            "

## 2023-03-09 NOTE — PATIENT INSTRUCTIONS
Recommendations:  -Recommend applying heat and aspercream or voltaren to your right thigh.  -Also recommend massage to the area several times a day.  -Let us know if you'd like a referral to PT for IT band syndrome. We could also consider sports medicine in the future.    Follow up: 3-4 months in person      Reasons to Call    If you are having worsening/uncontrolled symptoms we want you to call!    You or your other physicians make any changes to medications we have prescribed.  -Please call for refills 4-5 days before you will run out of medication.    Important Phone Numbers, including: Refills, scheduling, and general questions     [facility] - Carmelina Davis. Palliative Care RN - [phone]    [facility] - Yu Chavez. Palliative Care RN - [phone]  *After hours or on weekends. Will connect you with on call MD. [phone]

## 2023-03-10 ENCOUNTER — TELEPHONE (OUTPATIENT)
Dept: PALLIATIVE CARE | Facility: CLINIC | Age: 88
End: 2023-03-10
Payer: MEDICARE

## 2023-03-10 NOTE — TELEPHONE ENCOUNTER
----- Message from Judy Davis, RN sent at 3/9/2023  3:13 PM CST -----  Regarding: In person rtn with [facility]  Good afternoon,    Please call pt's dtr Rachelle Friday or Monday to schedule an in person rtn visit with [facility] in 3-4 months.    Thanks,    Carmelina

## 2023-03-17 NOTE — TELEPHONE ENCOUNTER
Rachelle goelnts to hold off on scheduling now, she has to look at her schedule, would like a call back some other time.

## 2023-04-03 DIAGNOSIS — G89.3 CANCER RELATED PAIN: ICD-10-CM

## 2023-04-04 RX ORDER — HYDROCODONE BITARTRATE AND ACETAMINOPHEN 5; 325 MG/1; MG/1
TABLET ORAL
Qty: 60 TABLET | Refills: 0 | Status: SHIPPED | OUTPATIENT
Start: 2023-04-07 | End: 2023-04-24

## 2023-04-04 NOTE — TELEPHONE ENCOUNTER
[facility]                                                                           Received Interface, Eprescribing from pharmacy  requesting refill of Norco     Last refill: 3.8.2023  Last office visit with Palliative Care 3.9.2023  Next office visit with Palliative Care:  Due in June/July not yet scheduled      Reviewed [place] with no concerns  Last sold on: 3.10.2023    Will route request to MD for review.    Christy Mao LPN  Palliative Care Nurse Coordinator  [phone]

## 2023-04-24 DIAGNOSIS — G89.3 CANCER RELATED PAIN: ICD-10-CM

## 2023-04-24 RX ORDER — HYDROCODONE BITARTRATE AND ACETAMINOPHEN 5; 325 MG/1; MG/1
1 TABLET ORAL 2 TIMES DAILY PRN
Qty: 60 TABLET | Refills: 0 | Status: SHIPPED | OUTPATIENT
Start: 2023-05-08 | End: 2023-05-10

## 2023-05-01 DIAGNOSIS — G89.3 CANCER RELATED PAIN: ICD-10-CM

## 2023-05-02 RX ORDER — HYDROCODONE BITARTRATE AND ACETAMINOPHEN 5; 325 MG/1; MG/1
TABLET ORAL
Qty: 60 TABLET | Refills: 0 | OUTPATIENT
Start: 2023-05-02

## 2023-05-08 DIAGNOSIS — G89.3 CANCER RELATED PAIN: ICD-10-CM

## 2023-05-10 RX ORDER — HYDROCODONE BITARTRATE AND ACETAMINOPHEN 5; 325 MG/1; MG/1
TABLET ORAL
Qty: 60 TABLET | Refills: 0 | Status: SHIPPED | OUTPATIENT
Start: 2023-05-10 | End: 2023-06-05

## 2023-05-10 NOTE — TELEPHONE ENCOUNTER
Received electronic communication from pharmacy requesting refill of Norco.    Last refill: 4/10/23  Last office visit: 3/9/23  Scheduled for follow up 7/13/23     Will route request to MD for review.     Reviewed [place]  Report.

## 2023-06-05 DIAGNOSIS — G89.3 CANCER RELATED PAIN: ICD-10-CM

## 2023-06-05 RX ORDER — HYDROCODONE BITARTRATE AND ACETAMINOPHEN 5; 325 MG/1; MG/1
TABLET ORAL
Qty: 60 TABLET | Refills: 0 | Status: SHIPPED | OUTPATIENT
Start: 2023-06-05 | End: 2023-06-22

## 2023-06-05 NOTE — TELEPHONE ENCOUNTER
Received electronic communication from pharmacy requesting refill of norco.    Last refill: 5/10/23  Last office visit: 3/9/23  Scheduled for follow up 7/13/23     Will route request to MD for review.     Reviewed [place]  Report.

## 2023-06-19 ENCOUNTER — APPOINTMENT (OUTPATIENT)
Dept: LAB | Facility: CLINIC | Age: 88
End: 2023-06-19
Payer: MEDICARE

## 2023-06-19 DIAGNOSIS — G89.3 CANCER RELATED PAIN: ICD-10-CM

## 2023-06-19 LAB
BASOPHILS # BLD AUTO: 0 10E3/UL (ref 0–0.2)
BASOPHILS NFR BLD AUTO: 1 %
EOSINOPHIL # BLD AUTO: 0.3 10E3/UL (ref 0–0.7)
EOSINOPHIL NFR BLD AUTO: 3 %
ERYTHROCYTE [DISTWIDTH] IN BLOOD BY AUTOMATED COUNT: 12.9 % (ref 10–15)
HCT VFR BLD AUTO: 37.8 % (ref 35–47)
HGB BLD-MCNC: 12.6 G/DL (ref 11.7–15.7)
IMM GRANULOCYTES # BLD: 0 10E3/UL
IMM GRANULOCYTES NFR BLD: 0 %
LYMPHOCYTES # BLD AUTO: 2.5 10E3/UL (ref 0.8–5.3)
LYMPHOCYTES NFR BLD AUTO: 29 %
MCH RBC QN AUTO: 30 PG (ref 26.5–33)
MCHC RBC AUTO-ENTMCNC: 33.3 G/DL (ref 31.5–36.5)
MCV RBC AUTO: 90 FL (ref 78–100)
MONOCYTES # BLD AUTO: 0.8 10E3/UL (ref 0–1.3)
MONOCYTES NFR BLD AUTO: 10 %
NEUTROPHILS # BLD AUTO: 5 10E3/UL (ref 1.6–8.3)
NEUTROPHILS NFR BLD AUTO: 58 %
PLATELET # BLD AUTO: 335 10E3/UL (ref 150–450)
RBC # BLD AUTO: 4.2 10E6/UL (ref 3.8–5.2)
WBC # BLD AUTO: 8.7 10E3/UL (ref 4–11)

## 2023-06-19 PROCEDURE — 85025 COMPLETE CBC W/AUTO DIFF WBC: CPT | Performed by: INTERNAL MEDICINE

## 2023-06-19 PROCEDURE — 80053 COMPREHEN METABOLIC PANEL: CPT | Performed by: INTERNAL MEDICINE

## 2023-06-19 PROCEDURE — 36415 COLL VENOUS BLD VENIPUNCTURE: CPT | Performed by: INTERNAL MEDICINE

## 2023-06-19 RX ORDER — HYDROCODONE BITARTRATE AND ACETAMINOPHEN 5; 325 MG/1; MG/1
TABLET ORAL
Qty: 60 TABLET | Refills: 0 | OUTPATIENT
Start: 2023-06-19

## 2023-06-20 LAB
ALBUMIN SERPL BCG-MCNC: 4.3 G/DL (ref 3.5–5.2)
ALP SERPL-CCNC: 107 U/L (ref 35–104)
ALT SERPL W P-5'-P-CCNC: 13 U/L (ref 0–50)
ANION GAP SERPL CALCULATED.3IONS-SCNC: 14 MMOL/L (ref 7–15)
AST SERPL W P-5'-P-CCNC: 16 U/L (ref 0–45)
BILIRUB SERPL-MCNC: 0.3 MG/DL
BUN SERPL-MCNC: 30.8 MG/DL (ref 8–23)
CALCIUM SERPL-MCNC: 10.1 MG/DL (ref 8.2–9.6)
CHLORIDE SERPL-SCNC: 104 MMOL/L (ref 98–107)
CREAT SERPL-MCNC: 1.4 MG/DL (ref 0.51–0.95)
DEPRECATED HCO3 PLAS-SCNC: 20 MMOL/L (ref 22–29)
GFR SERPL CREATININE-BSD FRML MDRD: 35 ML/MIN/1.73M2
GLUCOSE SERPL-MCNC: 108 MG/DL (ref 70–99)
POTASSIUM SERPL-SCNC: 5 MMOL/L (ref 3.4–5.3)
PROT SERPL-MCNC: 7.3 G/DL (ref 6.4–8.3)
SODIUM SERPL-SCNC: 138 MMOL/L (ref 136–145)

## 2023-06-22 ENCOUNTER — ONCOLOGY VISIT (OUTPATIENT)
Dept: ONCOLOGY | Facility: CLINIC | Age: 88
End: 2023-06-22
Attending: INTERNAL MEDICINE
Payer: MEDICARE

## 2023-06-22 VITALS
HEIGHT: 63 IN | HEART RATE: 99 BPM | OXYGEN SATURATION: 96 % | DIASTOLIC BLOOD PRESSURE: 65 MMHG | WEIGHT: 181 LBS | SYSTOLIC BLOOD PRESSURE: 113 MMHG | TEMPERATURE: 97.5 F | RESPIRATION RATE: 16 BRPM | BODY MASS INDEX: 32.07 KG/M2

## 2023-06-22 DIAGNOSIS — G89.3 CANCER RELATED PAIN: ICD-10-CM

## 2023-06-22 DIAGNOSIS — C67.9 MALIGNANT NEOPLASM OF URINARY BLADDER, UNSPECIFIED SITE (H): ICD-10-CM

## 2023-06-22 PROCEDURE — 99214 OFFICE O/P EST MOD 30 MIN: CPT | Performed by: INTERNAL MEDICINE

## 2023-06-22 PROCEDURE — G0463 HOSPITAL OUTPT CLINIC VISIT: HCPCS | Performed by: INTERNAL MEDICINE

## 2023-06-22 RX ORDER — ALBUTEROL SULFATE 0.83 MG/ML
2.5 SOLUTION RESPIRATORY (INHALATION)
Status: CANCELLED | OUTPATIENT
Start: 2023-06-22

## 2023-06-22 RX ORDER — ALBUTEROL SULFATE 90 UG/1
1-2 AEROSOL, METERED RESPIRATORY (INHALATION)
Status: CANCELLED
Start: 2023-06-22

## 2023-06-22 RX ORDER — HEPARIN SODIUM,PORCINE 10 UNIT/ML
5-20 VIAL (ML) INTRAVENOUS DAILY PRN
Status: CANCELLED | OUTPATIENT
Start: 2023-06-22

## 2023-06-22 RX ORDER — HYDROCODONE BITARTRATE AND ACETAMINOPHEN 5; 325 MG/1; MG/1
TABLET ORAL
Qty: 60 TABLET | Refills: 0 | Status: SHIPPED | OUTPATIENT
Start: 2023-07-06 | End: 2023-07-17

## 2023-06-22 RX ORDER — EPINEPHRINE 1 MG/ML
0.3 INJECTION, SOLUTION INTRAMUSCULAR; SUBCUTANEOUS EVERY 5 MIN PRN
Status: CANCELLED | OUTPATIENT
Start: 2023-06-22

## 2023-06-22 RX ORDER — HEPARIN SODIUM (PORCINE) LOCK FLUSH IV SOLN 100 UNIT/ML 100 UNIT/ML
5 SOLUTION INTRAVENOUS
Status: CANCELLED | OUTPATIENT
Start: 2023-06-22

## 2023-06-22 RX ORDER — MEPERIDINE HYDROCHLORIDE 25 MG/ML
25 INJECTION INTRAMUSCULAR; INTRAVENOUS; SUBCUTANEOUS EVERY 30 MIN PRN
Status: CANCELLED | OUTPATIENT
Start: 2023-06-22

## 2023-06-22 RX ORDER — METHYLPREDNISOLONE SODIUM SUCCINATE 125 MG/2ML
125 INJECTION, POWDER, LYOPHILIZED, FOR SOLUTION INTRAMUSCULAR; INTRAVENOUS
Status: CANCELLED
Start: 2023-06-22

## 2023-06-22 RX ORDER — DIPHENHYDRAMINE HYDROCHLORIDE 50 MG/ML
50 INJECTION INTRAMUSCULAR; INTRAVENOUS
Status: CANCELLED
Start: 2023-06-22

## 2023-06-22 ASSESSMENT — PAIN SCALES - GENERAL: PAINLEVEL: NO PAIN (0)

## 2023-06-22 NOTE — TELEPHONE ENCOUNTER
Received fax from pharmacy requesting refill of Norco.     Last refill: 6/8/23  Last office visit: 3/9/23  Scheduled for follow up 7/13/23     Will route request to MD for review.     Reviewed [place]  Report.

## 2023-06-22 NOTE — NURSING NOTE
"Oncology Rooming Note    June 22, 2023 1:13 PM   Christiana Salazar is a 89 year old female who presents for:    Chief Complaint   Patient presents with     Oncology Clinic Visit     Initial Vitals: /65 (Cuff Size: Adult Regular)   Pulse 99   Temp 97.5  F (36.4  C) (Tympanic)   Resp 16   Ht 1.588 m (5' 2.5\")   Wt 82.1 kg (181 lb)   SpO2 96%   BMI 32.58 kg/m   Estimated body mass index is 32.58 kg/m  as calculated from the following:    Height as of this encounter: 1.588 m (5' 2.5\").    Weight as of this encounter: 82.1 kg (181 lb). Body surface area is 1.9 meters squared.  No Pain (0) Comment: Data Unavailable   No LMP recorded.  Allergies reviewed: Yes  Medications reviewed: Yes    Medications: Medication refills not needed today.  Pharmacy name entered into [employer]:    [id number] [place] [facility] [address] [address]  [facility] - [place], [place] - [address] [place] [address]    Clinical concerns: f/u       Cassandra Godoy, PRIYANKA              "

## 2023-06-22 NOTE — LETTER
"    6/22/2023         RE: Christiana Salazar  [address] [facility] [address]        Dear Colleague,    Thank you for referring your patient, Christiana Salazar, to the [facility]. Please see a copy of my visit note below.    [facility]    Hematology/Oncology Established Patient Follow-up Note    Treatment Summary:      Today's Date: 6/22/23    Reason for Follow-up: Muscle invasive bladder cancer  Referring Provider: Trinity Sullivan MD      HISTORY OF PRESENT ILLNESS: Christiana Salazar is a 89 year old female who is referred for muscle invasive bladder cancer. Past medical history is significant for HTN, HLD, GERD, cognitive decline, and arthritis.     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 Donovan at [place]. She received induction BCG x 6 per her daughter who accompanies her today with complete response but no maintenance treatment was offered. Surveillance cystoscopy in November of 2021 showed evidence of recurrence for which she underwent TURBT on 12/17/2021 by Dr. Orlin Humphrey:      \"TURBT checklist  1) Number of tumors: 1  2) Size of largest tumor: 2 cm  3) Characteristic of tumor: papillary with some sloughing debris, superficial  4) Recurrent vs. Primary: primary  5) Presence of CIS: No  6) Bimanual exam under anesthesia: no  7) Visually complete resection: yes  9) Visualization of detrusor muscle in resection base: no  10) Visual evaluation of perforation: no\"     Pathology revealed poorly differentiated tumor with basaloid features with extensive involvement of detrusor muscle. She had a restaging CT urogram done on 1/4/2022 with no clear evidence of metastatic disease.      Patient lives in her own apartment at a nursing care facility. She is able to perform most ADLs without significant issue. She will meet with Rad " Onc in early May 2022. She denies weight loss, fever, or evidence of hematuria.    Referred to Radiology downtown. Patient and daughter stated they arrived in the parking structure and decided against meeting with the team as they were overwhelmed and did not want to receive therapy downtown. No complaints of hematuria currently. No pain, weight loss.    Patient and daughter again discussed and she did not end up meeting with radiation oncology with [facility].     Patient met with palliative care. She has not had follow up with urology. She confirms she still does not wish to proceed with systemic chemotherapy or radiation.      INTERIM HISTORY:  No pain or hematuria currently. Daughter states dementia is worsening. Daughter states patient has had decreased hydration and PO intake. She has been reporting increased low abdominal pain intermittently.       REVIEW OF SYSTEMS:   A 14 point ROS was reviewed with pertinent positives and negatives in the HPI.       HOME MEDICATIONS:  Current Outpatient Medications   Medication Sig Dispense Refill     Docusate Sodium (COLACE PO) Take by mouth 2 times daily       fluticasone (FLONASE) 50 MCG/ACT nasal spray 1 spray       furosemide (LASIX) 40 MG tablet Take 20 mg by mouth every other day       [START ON 7/6/2023] HYDROcodone-acetaminophen (NORCO) 5-325 MG tablet TAKE 1 TABLET BY MOUTH TWICE DAILY (MAX 3GMS/APAP PER 24HRS) 60 tablet 0     lisinopril (ZESTRIL) 20 MG tablet lisinopril 20 mg tablet       methylPREDNISolone (MEDROL) 32 MG tablet Take 1 tablet (32mg) 12 hours and 2 hours prior to CT scan. 2 tablet 0     Multiple Vitamin (MULTI-VITAMINS) TABS Take 1 tablet by mouth daily       oxybutynin ER (DITROPAN-XL) 10 MG 24 hr tablet Take 10 mg by mouth 2 times daily       potassium aminobenzoate 500 MG CAPS capsule Twice a week       simvastatin (ZOCOR) 40 MG tablet At Bedtime       traZODone (DESYREL) 50 MG tablet trazodone 50 mg tablet       albuterol (PROVENTIL)  "(2.5 MG/3ML) 0.083% neb solution Inhale 2.5 mg into the lungs (Patient not taking: Reported on 12/15/2022)           ALLERGIES:  Allergies   Allergen Reactions     Contrast Dye Anaphylaxis     Iodinated Contrast Media Shortness Of Breath     Naproxen Rash     Face broke out in a rash       Iodine      Seasonal Allergies Other (See Comments)     Sinus drainage         PAST MEDICAL HISTORY:  No past medical history on file.      PAST SURGICAL HISTORY:  No past surgical history on file.      SOCIAL HISTORY:  Social History     Socioeconomic History     Marital status:      Spouse name: Not on file     Number of children: Not on file     Years of education: Not on file     Highest education level: Not on file   Occupational History     Not on file   Tobacco Use     Smoking status: Never     Smokeless tobacco: Never   Substance and Sexual Activity     Alcohol use: Not Currently     Drug use: Not on file     Sexual activity: Not on file   Other Topics Concern     Not on file   Social History Narrative     Not on file     Social Determinants of Health     Financial Resource Strain: Not on file   Food Insecurity: Not on file   Transportation Needs: Not on file   Physical Activity: Not on file   Stress: Not on file   Social Connections: Not on file   Intimate Partner Violence: Not on file   Housing Stability: Not on file         FAMILY HISTORY:  No family history on file.      PHYSICAL EXAM:  Vital signs:  /65 (Cuff Size: Adult Regular)   Pulse 99   Temp 97.5  F (36.4  C) (Tympanic)   Resp 16   Ht 1.588 m (5' 2.5\")   Wt 82.1 kg (181 lb)   SpO2 96%   BMI 32.58 kg/m     ECO  GENERAL/CONSTITUTIONAL: No acute distress. Chronically ill appearing.   RESPIRATORY: Clear to auscultation bilaterally. No crackles or wheezing.   CARDIOVASCULAR: Regular rate and rhythm without murmurs, gallops, or rubs.  MUSCULOSKELETAL: Warm and well-perfused, no cyanosis, clubbing, or edema.         LABS:   Latest Reference " Range & Units 23 13:01   Sodium 136 - 145 mmol/L 138   Potassium 3.4 - 5.3 mmol/L 5.0   Chloride 98 - 107 mmol/L 104   Carbon Dioxide (CO2) 22 - 29 mmol/L 20 (L)   Urea Nitrogen 8.0 - 23.0 mg/dL 30.8 (H)   Creatinine 0.51 - 0.95 mg/dL 1.40 (H)   GFR Estimate >60 mL/min/1.73m2 35 (L)   Calcium 8.2 - 9.6 mg/dL 10.1 (H)   Anion Gap 7 - 15 mmol/L 14   Albumin 3.5 - 5.2 g/dL 4.3   Protein Total 6.4 - 8.3 g/dL 7.3   Alkaline Phosphatase 35 - 104 U/L 107 (H)   ALT 0 - 50 U/L 13   AST 0 - 45 U/L 16   Bilirubin Total <=1.2 mg/dL 0.3   Glucose 70 - 99 mg/dL 108 (H)   WBC 4.0 - 11.0 10e3/uL 8.7   Hemoglobin 11.7 - 15.7 g/dL 12.6   Hematocrit 35.0 - 47.0 % 37.8   Platelet Count 150 - 450 10e3/uL 335   RBC Count 3.80 - 5.20 10e6/uL 4.20   MCV 78 - 100 fL 90   MCH 26.5 - 33.0 pg 30.0   MCHC 31.5 - 36.5 g/dL 33.3   RDW 10.0 - 15.0 % 12.9   % Neutrophils % 58   % Lymphocytes % 29   % Monocytes % 10   % Eosinophils % 3   % Basophils % 1   Absolute Basophils 0.0 - 0.2 10e3/uL 0.0   Absolute Eosinophils 0.0 - 0.7 10e3/uL 0.3   Absolute Immature Granulocytes <=0.4 10e3/uL 0.0   Absolute Lymphocytes 0.8 - 5.3 10e3/uL 2.5   Absolute Monocytes 0.0 - 1.3 10e3/uL 0.8   % Immature Granulocytes % 0   Absolute Neutrophils 1.6 - 8.3 10e3/uL 5.0           PATHOLOGY:  Surgical pathology                        Surgical Pathology Report     Patient Name: GERARDO HEATH                    Accession #: [id number]   Med: Rec.: [id number]                               Phone Number: [phone]   : 1932 (Age: 89)                          Gender: F   Client: 10 - [facility] [employer]              Location: [facility])                                                     Taken: 2021                                                     Received: 2021                                                     Reported: 2021   Physician(s): GUERA BASS      ========================================================================   CLINICAL HISTORY   Malignant neoplasm of overlapping sites of bladder     OPERATIVE DIAGNOSES       Operation/Specimen: A: Urinary bladder, TURBT left anterior neck TUMOR       PATHOLOGICAL DIAGNOSIS:   A.  Urinary bladder, TURBT left anterior neck TUMOR:    . 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 See   comment    . Tumor invades into muscularis propria.     COMMENT   Sections show poorly differentiated carcinoma with basaloid features. No   definitive malignant surface component is present. Differential diagnosis   includes; urothelial carcinoma with basaloid features and basaloid squamous   cell carcinoma. It is hard to distinguish between these two differential   diagnosis based on morphologic and immunohistochemical features. Although we   favor that, this represent a bladder primary tumor, the possibility of   secondary bladder involvement by tumors from other organs; mainly gynecologic   tract, should be clinically ruled out.              Final Diagnosis   Urine, cytology:                 Interpretation:                  Atypical Urothelial Cells.  See comment.                 Adequacy:                 Satisfactory for evaluation.         IMAGING:  CT Urogram 1/10/22:  IMPRESSION:  1. No B/L hydroureteronephrosis. Mild thickening of the wall of the urinary bladder, a nonspecific finding. No convincing evidence of metastatic disease in the abdomen/pelvis.  2. Sub-6 mm low-attenuation left renal lesion, progressed in size from CT 5/19/15. This is too small to characterize, statistically represents cyst.  3. Stable additional incidental findings.      CT CAP 5/4/22:                                                                 IMPRESSION:  1.  Mosaic attenuation pattern of the lungs is nonspecific but may  indicate small vessel or small airways disease.  2.  There are a few scattered pulmonary nodules  measuring less than 4  mm. No prior chest CT available for comparison. Attention at  follow-up.  3.  No evidence of metastatic malignancy in the abdomen or pelvis.    CT CAP 12/8/22:  IMPRESSION:   1.  No significant interval change compared to 5/4/2022.  2.  Few small pulmonary nodules in both lungs measure 0.5 cm or  smaller, and are unchanged.  3.  Mosaic attenuation throughout both lungs is unchanged, and may be  related to air trapping.  4.  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         ASSESSMENT/PLAN:  Shania Salazar is an 89 year old female who is referred for muscle invasive bladder cancer. Past medical history is significant for HTN, HLD, GERD, cognitive decline, and arthritis.     1) Muscle-invasive urothelial carcinoma  -Originally diagnosed in November 2020 (non-muscle invasive, but high-risk) s/p BCG x6.  -Recurrent, muscle-invasive disease diagnosed in November-December 2021.  -Patient has been deemed a poor surgical candidate for cystectomy, to which I am in agreement. Furthermore, patient and daughter would elect against cystectomy.  -We discussed superior locoregional DFS (absolute difference of 12% at 2 years favoring chemoRT). The 5 year overall survival rates are approximately 48% with chemoRT and 35% with radiation alone (N Engl J Med 2012; 366:2643-9552 DOI: 10.1056/[id number]).  -We reviewed treatment with mitomycin and fluorouracil:     28-45 day cycle for 1 cycle with concurrent RT  -Mitomycin 12 mg/m2 IV Day 1  -Fluorouracil 500 mg/m2 IV continuous over 24 hours Days 1-5 and 22-26     -Patient and daughter inform me they have come to the conclusion they will not move forward with systemic chemotherapy. They have significant reservation over toxicity and prize quality of life given the fact that patient is still quite active with crocheting and gardening. I discussed this regimen is typically well-tolerated, but side effects due include marrow  suppression with increased risk of neutropenia/infection, blood transfusion, diarrhea, nausea/vomiting, cardiac events.   -Initially, patient stating she would not be willing to move forward with radiation alone, but I have asked if she would be willing to reschedule with Radiation Oncology as she has not had evaluation to make a fully informed decision. Patient continues to be open to discussion with Radiation Oncology. [facility].  -5/25/22: Patient did not end up meeting with MRO. She and daughter have discussed again and patient does not wish to proceed with chemoradiation or radiation at this time valuing her quality of life. She is to turn 89 in three weeks and a large birthday gathering is planned. They are open to meeting with palliative care at this time to assist in advanced care planning. Patient is asymptomatic at this time, but is likely to develop pain and fatigue as her underlying malignancy progresses. Patient may require follow up with Urology. They also wish to speak with social work in regards to supplies.  -Patient and daughter met with hospice. In review of records, it appears time was limited and they were not able to discusse advanced care planning.   -12/15/22: CBC stable. She continues to have CKD. CT CAP is showing stable B/L pulmonary nodules <0.5 cm. No new mass/LAD. No further evidence of disease in the A/P. Patient is soon to remeet with palliative care in January 2023.   -6/2023: CBC is stable. Patient has decreased renal function. Unfortunately, imaging has not been completed. She has had decreased hydration. Patient's daughter states she has had increased low abdominal pain. Obtain CT CAP to monitor for progression. She continues to follow with palliative care with next visit 7/13/23.      2) Patient confirms she does not wish to proceed with systemic treatment at this time. Can monitor with labs and imaging  every 6 months to monitor for progression. I discussed she is not hospice appropriate at this time, but will require services in the future with progression as she will not be agreeable to treatment. I had discussed if she has hematuria in the future, will need urology evaluation.    3) -Schedule CT CAP and IVF to follow.  -Follow up in 6 months with me in clinic with repeat labs and imaging.        Florina Edgar DO  Hematology/Oncology  [facility]          Again, thank you for allowing me to participate in the care of your patient.        Sincerely,        Florina Edgar, DO

## 2023-06-22 NOTE — PROGRESS NOTES
"[facility]    Hematology/Oncology Established Patient Follow-up Note    Treatment Summary:      Today's Date: 6/22/23    Reason for Follow-up: Muscle invasive bladder cancer  Referring Provider: Trinity Sullivan MD      HISTORY OF PRESENT ILLNESS: Christiana Salazar is a 89 year old female who is referred for muscle invasive bladder cancer. Past medical history is significant for HTN, HLD, GERD, cognitive decline, and arthritis.     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 Donovan at [place]. She received induction BCG x 6 per her daughter who accompanies her today with complete response but no maintenance treatment was offered. Surveillance cystoscopy in November of 2021 showed evidence of recurrence for which she underwent TURBT on 12/17/2021 by Dr. Orlin Humphrey:      \"TURBT checklist  1) Number of tumors: 1  2) Size of largest tumor: 2 cm  3) Characteristic of tumor: papillary with some sloughing debris, superficial  4) Recurrent vs. Primary: primary  5) Presence of CIS: No  6) Bimanual exam under anesthesia: no  7) Visually complete resection: yes  9) Visualization of detrusor muscle in resection base: no  10) Visual evaluation of perforation: no\"     Pathology revealed poorly differentiated tumor with basaloid features with extensive involvement of detrusor muscle. She had a restaging CT urogram done on 1/4/2022 with no clear evidence of metastatic disease.      Patient lives in her own apartment at a nursing care facility. She is able to perform most ADLs without significant issue. She will meet with Rad Onc in early May 2022. She denies weight loss, fever, or evidence of hematuria.    Referred to Radiology downtown. Patient and daughter stated they arrived in the parking structure and decided against meeting with the team as they were overwhelmed and did " not want to receive therapy downtown. No complaints of hematuria currently. No pain, weight loss.    Patient and daughter again discussed and she did not end up meeting with radiation oncology with rescheduling to [facility].     Patient met with palliative care. She has not had follow up with urology. She confirms she still does not wish to proceed with systemic chemotherapy or radiation.      INTERIM HISTORY:  No pain or hematuria currently. Daughter states dementia is worsening. Daughter states patient has had decreased hydration and PO intake. She has been reporting increased low abdominal pain intermittently.       REVIEW OF SYSTEMS:   A 14 point ROS was reviewed with pertinent positives and negatives in the HPI.       HOME MEDICATIONS:  Current Outpatient Medications   Medication Sig Dispense Refill     Docusate Sodium (COLACE PO) Take by mouth 2 times daily       fluticasone (FLONASE) 50 MCG/ACT nasal spray 1 spray       furosemide (LASIX) 40 MG tablet Take 20 mg by mouth every other day       [START ON 7/6/2023] HYDROcodone-acetaminophen (NORCO) 5-325 MG tablet TAKE 1 TABLET BY MOUTH TWICE DAILY (MAX 3GMS/APAP PER 24HRS) 60 tablet 0     lisinopril (ZESTRIL) 20 MG tablet lisinopril 20 mg tablet       methylPREDNISolone (MEDROL) 32 MG tablet Take 1 tablet (32mg) 12 hours and 2 hours prior to CT scan. 2 tablet 0     Multiple Vitamin (MULTI-VITAMINS) TABS Take 1 tablet by mouth daily       oxybutynin ER (DITROPAN-XL) 10 MG 24 hr tablet Take 10 mg by mouth 2 times daily       potassium aminobenzoate 500 MG CAPS capsule Twice a week       simvastatin (ZOCOR) 40 MG tablet At Bedtime       traZODone (DESYREL) 50 MG tablet trazodone 50 mg tablet       albuterol (PROVENTIL) (2.5 MG/3ML) 0.083% neb solution Inhale 2.5 mg into the lungs (Patient not taking: Reported on 12/15/2022)           ALLERGIES:  Allergies   Allergen Reactions     Contrast Dye Anaphylaxis     Iodinated Contrast Media Shortness Of Breath     Naproxen Rash  "    Face broke out in a rash       Iodine      Seasonal Allergies Other (See Comments)     Sinus drainage         PAST MEDICAL HISTORY:  No past medical history on file.      PAST SURGICAL HISTORY:  No past surgical history on file.      SOCIAL HISTORY:  Social History     Socioeconomic History     Marital status:      Spouse name: Not on file     Number of children: Not on file     Years of education: Not on file     Highest education level: Not on file   Occupational History     Not on file   Tobacco Use     Smoking status: Never     Smokeless tobacco: Never   Substance and Sexual Activity     Alcohol use: Not Currently     Drug use: Not on file     Sexual activity: Not on file   Other Topics Concern     Not on file   Social History Narrative     Not on file     Social Determinants of Health     Financial Resource Strain: Not on file   Food Insecurity: Not on file   Transportation Needs: Not on file   Physical Activity: Not on file   Stress: Not on file   Social Connections: Not on file   Intimate Partner Violence: Not on file   Housing Stability: Not on file         FAMILY HISTORY:  No family history on file.      PHYSICAL EXAM:  Vital signs:  /65 (Cuff Size: Adult Regular)   Pulse 99   Temp 97.5  F (36.4  C) (Tympanic)   Resp 16   Ht 1.588 m (5' 2.5\")   Wt 82.1 kg (181 lb)   SpO2 96%   BMI 32.58 kg/m     ECO  GENERAL/CONSTITUTIONAL: No acute distress. Chronically ill appearing.   RESPIRATORY: Clear to auscultation bilaterally. No crackles or wheezing.   CARDIOVASCULAR: Regular rate and rhythm without murmurs, gallops, or rubs.  MUSCULOSKELETAL: Warm and well-perfused, no cyanosis, clubbing, or edema.         LABS:   Latest Reference Range & Units 23 13:01   Sodium 136 - 145 mmol/L 138   Potassium 3.4 - 5.3 mmol/L 5.0   Chloride 98 - 107 mmol/L 104   Carbon Dioxide (CO2) 22 - 29 mmol/L 20 (L)   Urea Nitrogen 8.0 - 23.0 mg/dL 30.8 (H)   Creatinine 0.51 - 0.95 mg/dL 1.40 (H)   GFR " Estimate >60 mL/min/1.73m2 35 (L)   Calcium 8.2 - 9.6 mg/dL 10.1 (H)   Anion Gap 7 - 15 mmol/L 14   Albumin 3.5 - 5.2 g/dL 4.3   Protein Total 6.4 - 8.3 g/dL 7.3   Alkaline Phosphatase 35 - 104 U/L 107 (H)   ALT 0 - 50 U/L 13   AST 0 - 45 U/L 16   Bilirubin Total <=1.2 mg/dL 0.3   Glucose 70 - 99 mg/dL 108 (H)   WBC 4.0 - 11.0 10e3/uL 8.7   Hemoglobin 11.7 - 15.7 g/dL 12.6   Hematocrit 35.0 - 47.0 % 37.8   Platelet Count 150 - 450 10e3/uL 335   RBC Count 3.80 - 5.20 10e6/uL 4.20   MCV 78 - 100 fL 90   MCH 26.5 - 33.0 pg 30.0   MCHC 31.5 - 36.5 g/dL 33.3   RDW 10.0 - 15.0 % 12.9   % Neutrophils % 58   % Lymphocytes % 29   % Monocytes % 10   % Eosinophils % 3   % Basophils % 1   Absolute Basophils 0.0 - 0.2 10e3/uL 0.0   Absolute Eosinophils 0.0 - 0.7 10e3/uL 0.3   Absolute Immature Granulocytes <=0.4 10e3/uL 0.0   Absolute Lymphocytes 0.8 - 5.3 10e3/uL 2.5   Absolute Monocytes 0.0 - 1.3 10e3/uL 0.8   % Immature Granulocytes % 0   Absolute Neutrophils 1.6 - 8.3 10e3/uL 5.0           PATHOLOGY:  Surgical pathology                        Surgical Pathology Report     Patient Name: GERARDO HEATH                    Accession #: [id number]   Med: Rec.: [id number]                               Phone Number: [phone]   : 1932 (Age: 89)                          Gender: F   Client: [facility]              Location: [facility])                                                     Taken: 2021                                                     Received: 2021                                                     Reported: 2021   Physician(s): GUERA BASS     ========================================================================   CLINICAL HISTORY   Malignant neoplasm of overlapping sites of bladder     OPERATIVE DIAGNOSES       Operation/Specimen: A: Urinary bladder, TURBT left anterior neck TUMOR       PATHOLOGICAL DIAGNOSIS:   A.  Urinary bladder, TURBT left anterior neck  TUMOR:    . 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 See   comment    . Tumor invades into muscularis propria.     COMMENT   Sections show poorly differentiated carcinoma with basaloid features. No   definitive malignant surface component is present. Differential diagnosis   includes; urothelial carcinoma with basaloid features and basaloid squamous   cell carcinoma. It is hard to distinguish between these two differential   diagnosis based on morphologic and immunohistochemical features. Although we   favor that, this represent a bladder primary tumor, the possibility of   secondary bladder involvement by tumors from other organs; mainly gynecologic   tract, should be clinically ruled out.              Final Diagnosis   Urine, cytology:                 Interpretation:                  Atypical Urothelial Cells.  See comment.                 Adequacy:                 Satisfactory for evaluation.         IMAGING:  CT Urogram 1/10/22:  IMPRESSION:  1. No B/L hydroureteronephrosis. Mild thickening of the wall of the urinary bladder, a nonspecific finding. No convincing evidence of metastatic disease in the abdomen/pelvis.  2. Sub-6 mm low-attenuation left renal lesion, progressed in size from CT 5/19/15. This is too small to characterize, statistically represents cyst.  3. Stable additional incidental findings.      CT CAP 5/4/22:                                                                 IMPRESSION:  1.  Mosaic attenuation pattern of the lungs is nonspecific but may  indicate small vessel or small airways disease.  2.  There are a few scattered pulmonary nodules measuring less than 4  mm. No prior chest CT available for comparison. Attention at  follow-up.  3.  No evidence of metastatic malignancy in the abdomen or pelvis.    CT CAP 12/8/22:  IMPRESSION:   1.  No significant interval change compared to 5/4/2022.  2.  Few small pulmonary nodules in both lungs measure 0.5 cm or  smaller, and  are unchanged.  3.  Mosaic attenuation throughout both lungs is unchanged, and may be  related to air trapping.  4.  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         ASSESSMENT/PLAN:  Shania Salazar is an 89 year old female who is referred for muscle invasive bladder cancer. Past medical history is significant for HTN, HLD, GERD, cognitive decline, and arthritis.     1) Muscle-invasive urothelial carcinoma  -Originally diagnosed in November 2020 (non-muscle invasive, but high-risk) s/p BCG x6.  -Recurrent, muscle-invasive disease diagnosed in November-December 2021.  -Patient has been deemed a poor surgical candidate for cystectomy, to which I am in agreement. Furthermore, patient and daughter would elect against cystectomy.  -We discussed superior locoregional DFS (absolute difference of 12% at 2 years favoring chemoRT). The 5 year overall survival rates are approximately 48% with chemoRT and 35% with radiation alone (N Engl J Med 2012; 366:6799-7849 DOI: 10.1056/VHCEvf3393846).  -We reviewed treatment with mitomycin and fluorouracil:     28-45 day cycle for 1 cycle with concurrent RT  -Mitomycin 12 mg/m2 IV Day 1  -Fluorouracil 500 mg/m2 IV continuous over 24 hours Days 1-5 and 22-26     -Patient and daughter inform me they have come to the conclusion they will not move forward with systemic chemotherapy. They have significant reservation over toxicity and prize quality of life given the fact that patient is still quite active with crocheting and gardening. I discussed this regimen is typically well-tolerated, but side effects due include marrow suppression with increased risk of neutropenia/infection, blood transfusion, diarrhea, nausea/vomiting, cardiac events.   -Initially, patient stating she would not be willing to move forward with radiation alone, but I have asked if she would be willing to reschedule with Radiation Oncology as she has not had evaluation to make a fully  informed decision. Patient continues to be open to discussion with Radiation Oncology. She canceled her visit previously as this was set for [facility]. Referral placed for MRO.  -5/25/22: Patient did not end up meeting with MRO. She and daughter have discussed again and patient does not wish to proceed with chemoradiation or radiation at this time valuing her quality of life. She is to turn 89 in three weeks and a large birthday gathering is planned. They are open to meeting with palliative care at this time to assist in advanced care planning. Patient is asymptomatic at this time, but is likely to develop pain and fatigue as her underlying malignancy progresses. Patient may require follow up with Urology. They also wish to speak with social work in regards to supplies.  -Patient and daughter met with hospice. In review of records, it appears time was limited and they were not able to discusse advanced care planning.   -12/15/22: CBC stable. She continues to have CKD. CT CAP is showing stable B/L pulmonary nodules <0.5 cm. No new mass/LAD. No further evidence of disease in the A/P. Patient is soon to remeet with palliative care in January 2023.   -6/2023: CBC is stable. Patient has decreased renal function. Unfortunately, imaging has not been completed. She has had decreased hydration. Patient's daughter states she has had increased low abdominal pain. Obtain CT CAP to monitor for progression. She continues to follow with palliative care with next visit 7/13/23.      2) Patient confirms she does not wish to proceed with systemic treatment at this time. Can monitor with labs and imaging every 6 months to monitor for progression. I discussed she is not hospice appropriate at this time, but will require services in the future with progression as she will not be agreeable to treatment. I had discussed if she has hematuria in the future, will need urology evaluation.    3) -Schedule CT CAP and IVF to follow.  -Follow up  in 6 months with me in clinic with repeat labs and imaging.        Florina Edgar DO  Hematology/Oncology  [facility]

## 2023-07-06 ENCOUNTER — PATIENT OUTREACH (OUTPATIENT)
Dept: ONCOLOGY | Facility: CLINIC | Age: 88
End: 2023-07-06
Payer: MEDICARE

## 2023-07-06 DIAGNOSIS — T50.8X5A ALLERGIC REACTION TO CONTRAST MATERIAL, INITIAL ENCOUNTER: ICD-10-CM

## 2023-07-06 RX ORDER — METHYLPREDNISOLONE 32 MG/1
TABLET ORAL
Qty: 2 TABLET | Refills: 0 | Status: SHIPPED | OUTPATIENT
Start: 2023-07-06

## 2023-07-06 NOTE — PROGRESS NOTES
Marisabel Queen, JAGDEEP  You 21 minutes ago (2:37 PM)     AK  I sent the medrol 32mg Rx to that pharmacy. If this Rx worked for her in the past, I wouldn't change it and be on the safe side.       Writer spoke to Christiana's daughter Rachelle about Medrol medication instructions. She verbalized understanding and will be administering this medication to Christiana.    Rachael Walters RN on 7/6/2023 at 2:59 PM

## 2023-07-06 NOTE — PROGRESS NOTES
Christiana is scheduled for a CT scan with contrast on 07/07/23. She has a history of contrast dye allergy. Per chart review, in the past she has taken Medrol. Writer will send urgent message to PA for recommendations.     Rachael Walters RN on 7/6/2023 at 1:26 PM

## 2023-07-07 ENCOUNTER — INFUSION THERAPY VISIT (OUTPATIENT)
Dept: INFUSION THERAPY | Facility: CLINIC | Age: 88
End: 2023-07-07
Attending: INTERNAL MEDICINE
Payer: MEDICARE

## 2023-07-07 ENCOUNTER — HOSPITAL ENCOUNTER (OUTPATIENT)
Dept: CT IMAGING | Facility: CLINIC | Age: 88
Discharge: HOME | End: 2023-07-07
Attending: INTERNAL MEDICINE | Admitting: INTERNAL MEDICINE
Payer: MEDICARE

## 2023-07-07 DIAGNOSIS — C67.9 MALIGNANT NEOPLASM OF URINARY BLADDER, UNSPECIFIED SITE (H): Primary | ICD-10-CM

## 2023-07-07 DIAGNOSIS — C67.9 MALIGNANT NEOPLASM OF URINARY BLADDER, UNSPECIFIED SITE (H): ICD-10-CM

## 2023-07-07 PROCEDURE — 258N000003 HC RX IP 258 OP 636: Performed by: INTERNAL MEDICINE

## 2023-07-07 PROCEDURE — G1010 CDSM STANSON: HCPCS

## 2023-07-07 PROCEDURE — 96360 HYDRATION IV INFUSION INIT: CPT

## 2023-07-07 PROCEDURE — 74177 CT ABD & PELVIS W/CONTRAST: CPT | Mod: MG

## 2023-07-07 PROCEDURE — 250N000011 HC RX IP 250 OP 636: Performed by: INTERNAL MEDICINE

## 2023-07-07 PROCEDURE — 250N000009 HC RX 250: Performed by: INTERNAL MEDICINE

## 2023-07-07 RX ORDER — HEPARIN SODIUM (PORCINE) LOCK FLUSH IV SOLN 100 UNIT/ML 100 UNIT/ML
5 SOLUTION INTRAVENOUS
Status: CANCELLED | OUTPATIENT
Start: 2023-07-07

## 2023-07-07 RX ORDER — ALBUTEROL SULFATE 0.83 MG/ML
2.5 SOLUTION RESPIRATORY (INHALATION)
Status: CANCELLED | OUTPATIENT
Start: 2023-07-07

## 2023-07-07 RX ORDER — HEPARIN SODIUM,PORCINE 10 UNIT/ML
5-20 VIAL (ML) INTRAVENOUS DAILY PRN
Status: CANCELLED | OUTPATIENT
Start: 2023-07-07

## 2023-07-07 RX ORDER — EPINEPHRINE 1 MG/ML
0.3 INJECTION, SOLUTION INTRAMUSCULAR; SUBCUTANEOUS EVERY 5 MIN PRN
Status: CANCELLED | OUTPATIENT
Start: 2023-07-07

## 2023-07-07 RX ORDER — IOPAMIDOL 755 MG/ML
500 INJECTION, SOLUTION INTRAVASCULAR ONCE
Status: COMPLETED | OUTPATIENT
Start: 2023-07-07 | End: 2023-07-07

## 2023-07-07 RX ORDER — METHYLPREDNISOLONE SODIUM SUCCINATE 125 MG/2ML
125 INJECTION, POWDER, LYOPHILIZED, FOR SOLUTION INTRAMUSCULAR; INTRAVENOUS
Status: CANCELLED
Start: 2023-07-07

## 2023-07-07 RX ORDER — MEPERIDINE HYDROCHLORIDE 25 MG/ML
25 INJECTION INTRAMUSCULAR; INTRAVENOUS; SUBCUTANEOUS EVERY 30 MIN PRN
Status: CANCELLED | OUTPATIENT
Start: 2023-07-07

## 2023-07-07 RX ORDER — ALBUTEROL SULFATE 90 UG/1
1-2 AEROSOL, METERED RESPIRATORY (INHALATION)
Status: CANCELLED
Start: 2023-07-07

## 2023-07-07 RX ORDER — DIPHENHYDRAMINE HYDROCHLORIDE 50 MG/ML
50 INJECTION INTRAMUSCULAR; INTRAVENOUS
Status: CANCELLED
Start: 2023-07-07

## 2023-07-07 RX ADMIN — IOPAMIDOL 89 ML: 755 INJECTION, SOLUTION INTRAVENOUS at 13:23

## 2023-07-07 RX ADMIN — SODIUM CHLORIDE 63 ML: 9 INJECTION, SOLUTION INTRAVENOUS at 13:23

## 2023-07-07 RX ADMIN — SODIUM CHLORIDE 1000 ML: 9 INJECTION, SOLUTION INTRAVENOUS at 13:58

## 2023-07-07 NOTE — PROGRESS NOTES
Infusion Nursing Note:  Christiana Salazar presents today for IVF post CT scan  Patient seen by provider today: No   present during visit today: Not Applicable.    Note: Pt had CT scan with contrast today. Most recent Creat mildly elevated at 1.4. To receive 1L IVF post scan to flush kidneys  Pt and daughter state no cardiac history. Fluids to infuse over one hour      Intravenous Access:  Peripheral IV placed.      Post Infusion Assessment:  Patient tolerated infusion without incident.  Blood return noted pre and post infusion.  Site patent and intact, free from redness, edema or discomfort.  No evidence of extravasations.  Access discontinued per protocol.       Discharge Plan:   AVS to patient via MYCHART.  Patient will return in 6 months for repeat labs/imaging for next appointment.   Patient discharged in stable condition accompanied by: daughter.  Departure Mode: Ambulatory.      Abigail Khan RN

## 2023-07-10 ENCOUNTER — TELEPHONE (OUTPATIENT)
Dept: ONCOLOGY | Facility: CLINIC | Age: 88
End: 2023-07-10
Payer: MEDICARE

## 2023-07-10 NOTE — TELEPHONE ENCOUNTER
7/10/23    Called patient's daughter to review CT scan. No answer. LM that scan was stable with no new findings and to call back with any questions. Per Dr. Edgar's note will return in 6 months.    Julio Cesar Davis PA-C

## 2023-07-12 NOTE — PROGRESS NOTES
Palliative Care Progress Note    Patient Name: Christiana Salazar  Primary Provider: Mery Vizcarra    Chief Complaint/Patient ID: Christiana Salazar is a 89 year old female with PMHx of muscle invasive bladder cancer. Dx 11/2020, s/p induction BCG x6 with CR and no maintenance tx offered. Evidence of recurrence 11/2021, s/p TURBT with no evidence of metastatic dz. She ultimately elected to not pursue systemic chemo or radiation therapy. Additional Past medical history is significant for HTN, HLD, GERD, cognitive decline, and arthritis (longstanding hx of chronic LBP with hx of injections).    Last Palliative care appointment: 3/9/23 with me. Suggested referral to PT or sports medicine for possible IT band syndrome.     Reviewed: Yes.    ORT Score: 0     Interim History:  Christiana Salazar is a 89 year old female who is seen today for a follow up visit with Palliative Care. She is here today with her daughter Rachelle who provides additional history.     Recent imaging again stable with no new disease. Planning Q6mos follow up with Oncology.    Overall states she is doing fairly well.  No big changes.  No real increase in pain.  She does have some lower abdominal discomfort which has been present for several months (which made daughter concerned her cancer had recurred).  This seems to improve when she is hydrated.  She does not drink quite enough water.  Her prior leg pain which seems to be from her IT band has resolved.    She is continue taking her to [place] tablets a daily.  Daughter generally gives her 1000 mg of Tylenol in the afternoon, however now the facility is managing patient's medications.  Daughter is requesting an order for Tylenol so that facility staff can give this to her.  This is primarily for the arthritis in her hands.    She had a wonderful 89st birthday celebration with about 12 family members and cupcakes with the other residents at her living facility.     Social History:  Moved to [place] from  [place].  Had 5 children, however unfortunately lost 2 of her sons suddenly within 6 months of each other in 2021.  Currently living at the [facility].  Was raised Gnosticist and was very involved in the [employer].  Social History     Tobacco Use     Smoking status: Never     Smokeless tobacco: Never   Substance Use Topics     Alcohol use: Not Currently       Family History- Reviewed in Epic.    Allergies   Allergen Reactions     Contrast Dye Anaphylaxis     Iodinated Contrast Media Shortness Of Breath     Naproxen Rash     Face broke out in a rash       Iodine      Seasonal Allergies Other (See Comments)     Sinus drainage       Advanced Care Planning: There is a legal power of  document on file naming daughter. No HCD currently available to us. Provided into 3/9/23.     Medications- Reviewed in Epic.    Past Medical History- Reviewed in [facility].    Past Surgical History- Reviewed in Epic.    Review of Systems:   ROS: 10 point ROS neg other than the symptoms noted above in the HPI.      Physical Exam:   BP 92/62   Pulse 80   Resp 16   SpO2 96%   Constitutional: Alert, pleasant, no apparent distress. Sitting up in chair.  Eyes: Sclera non-icteric, no eye discharge.  ENT: No nasal discharge. Ears grossly normal.  Respiratory: Unlabored respirations. Speaking in full sentences.  Musculoskeletal: Extremities appear normal- no gross deformities noted. No edema noted on upper body.   Skin: No suspicious lesions or rashes on visible skin.  Neurologic: Clear speech, no aphasia. No facial droop.  Psychiatric: Mentation appears slowed, appropriate attention. Some confusion. Affect normal/bright. Does not appear anxious or depressed.    Key Data Reviewed:  LABS: 6/19/23- Cr 1.40, GFR 35, Albumin 4.3, LFTs wnl. WBC 8.7, Hgb 12.6, Plts 335.     IMAGING: CT CAP 7/7/23- IMPRESSION:  1.  Stable exam without evidence for new disease.  2.  Stable multiple pulmonary nodules.  3.  Stable air trapping within the  bilateral lungs.  4.  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    Impression & Recommendations & Counseling:  Christiana Salazar is a 89 year old female with history of muscle invasive bladder cancer.    Back Pain - Chronic back pain stable.  -Ok to continue Norco 1 tablet BID and additional tylenol during the day.  We will send an order for 1000 mg of Tylenol to be administered by facility staff in the afternoon.  -Continue topical NSAID such as aspercream up to 4x daily.    Abdominal discomfort - Seems to worsen when she does not drink enough fluids.  Encouraged her to drink at least 3 glasses of water a day, and more if possible.  Daughter states she is not drinking that now.    Bladder cancer  Goals of care - Most recent scans showed no evidence of recurrence or metastatic disease. Patient and daughter were glad this was the case. She has said that if it were to recur, she would not want to go through additional systemic therapy due to the risk of affecting her quality of life.    Follow up: Okay to do 6 months to be in line with oncology appointments.         Total time spent on day of encounter is 34 mins, including reviewing record, review of above studies, above visit with patient (FTF 11:33 AM-12 PM), symptomatic discussion of mainly pain and thirst, including medication adjustments/prescription management, and documentation.     Yaritza Boggs,   Palliative Medicine   [id number]    Some chart documentation performed using Dragon Voice recognition Software. Although reviewed after completion, some words and grammatical errors may remain.

## 2023-07-13 ENCOUNTER — ONCOLOGY VISIT (OUTPATIENT)
Dept: ONCOLOGY | Facility: CLINIC | Age: 88
End: 2023-07-13
Attending: STUDENT IN AN ORGANIZED HEALTH CARE EDUCATION/TRAINING PROGRAM
Payer: MEDICARE

## 2023-07-13 VITALS
HEART RATE: 80 BPM | OXYGEN SATURATION: 96 % | RESPIRATION RATE: 16 BRPM | SYSTOLIC BLOOD PRESSURE: 92 MMHG | DIASTOLIC BLOOD PRESSURE: 62 MMHG

## 2023-07-13 DIAGNOSIS — M54.50 CHRONIC MIDLINE LOW BACK PAIN WITHOUT SCIATICA: ICD-10-CM

## 2023-07-13 DIAGNOSIS — M19.90 ARTHRITIS: ICD-10-CM

## 2023-07-13 DIAGNOSIS — Z71.89 GOALS OF CARE, COUNSELING/DISCUSSION: ICD-10-CM

## 2023-07-13 DIAGNOSIS — G89.29 CHRONIC MIDLINE LOW BACK PAIN WITHOUT SCIATICA: ICD-10-CM

## 2023-07-13 DIAGNOSIS — C67.9 MALIGNANT NEOPLASM OF URINARY BLADDER, UNSPECIFIED SITE (H): Primary | ICD-10-CM

## 2023-07-13 DIAGNOSIS — Z51.5 ENCOUNTER FOR PALLIATIVE CARE: ICD-10-CM

## 2023-07-13 PROCEDURE — G0463 HOSPITAL OUTPT CLINIC VISIT: HCPCS | Performed by: STUDENT IN AN ORGANIZED HEALTH CARE EDUCATION/TRAINING PROGRAM

## 2023-07-13 PROCEDURE — 99214 OFFICE O/P EST MOD 30 MIN: CPT | Performed by: STUDENT IN AN ORGANIZED HEALTH CARE EDUCATION/TRAINING PROGRAM

## 2023-07-13 RX ORDER — ACETAMINOPHEN 500 MG
1000 TABLET ORAL
Qty: 60 TABLET | Refills: 4 | Status: SHIPPED | OUTPATIENT
Start: 2023-07-13 | End: 2023-11-21

## 2023-07-13 ASSESSMENT — PAIN SCALES - GENERAL: PAINLEVEL: MILD PAIN (2)

## 2023-07-13 NOTE — LETTER
7/13/2023         RE: Christiana Salazar  [address] [address] [facility] [address]        Dear Colleague,    Thank you for referring your patient, Christiana Salazar, to the [facility] [place]. Please see a copy of my visit note below.    Palliative Care Progress Note    Patient Name: Christiana Salazar  Primary Provider: Mery Vizcarra    Chief Complaint/Patient ID: Christiana Salazar is a 89 year old female with PMHx of muscle invasive bladder cancer. Dx 11/2020, s/p induction BCG x6 with CR and no maintenance tx offered. Evidence of recurrence 11/2021, s/p TURBT with no evidence of metastatic dz. She ultimately elected to not pursue systemic chemo or radiation therapy. Additional Past medical history is significant for HTN, HLD, GERD, cognitive decline, and arthritis (longstanding hx of chronic LBP with hx of injections).    Last Palliative care appointment: 3/9/23 with me. Suggested referral to PT or sports medicine for possible IT band syndrome.     Reviewed: Yes.    ORT Score: 0     Interim History:  Christiana Salazar is a 89 year old female who is seen today for a follow up visit with Palliative Care. She is here today with her daughter Rachelle who provides additional history.     Recent imaging again stable with no new disease. Planning Q6mos follow up with Oncology.    Overall states she is doing fairly well.  No big changes.  No real increase in pain.  She does have some lower abdominal discomfort which has been present for several months (which made daughter concerned her cancer had recurred).  This seems to improve when she is hydrated.  She does not drink quite enough water.  Her prior leg pain which seems to be from her IT band has resolved.    She is continue taking her to [place] tablets a daily.  Daughter generally gives her 1000 mg of Tylenol in the afternoon, however now the facility is managing patient's medications.  Daughter is requesting an order for Tylenol so that facility staff can  give this to her.  This is primarily for the arthritis in her hands.    She had a wonderful 89st birthday celebration with about 12 family members and cupcakes with the other residents at her living facility.     Social History:  Moved to [place] from [place].  Had 5 children, however unfortunately lost 2 of her sons suddenly within 6 months of each other in 2021.  Currently living at the [facility].  Was raised Hindu and was very involved in the Mu-ism.  Social History     Tobacco Use     Smoking status: Never     Smokeless tobacco: Never   Substance Use Topics     Alcohol use: Not Currently       Family History- Reviewed in Epic.    Allergies   Allergen Reactions     Contrast Dye Anaphylaxis     Iodinated Contrast Media Shortness Of Breath     Naproxen Rash     Face broke out in a rash       Iodine      Seasonal Allergies Other (See Comments)     Sinus drainage       Advanced Care Planning: There is a legal power of  document on file naming daughter. No HCD currently available to us. Provided into 3/9/23.     Medications- Reviewed in Epic.    Past Medical History- Reviewed in [facility].    Past Surgical History- Reviewed in Epic.    Review of Systems:   ROS: 10 point ROS neg other than the symptoms noted above in the HPI.      Physical Exam:   BP 92/62   Pulse 80   Resp 16   SpO2 96%   Constitutional: Alert, pleasant, no apparent distress. Sitting up in chair.  Eyes: Sclera non-icteric, no eye discharge.  ENT: No nasal discharge. Ears grossly normal.  Respiratory: Unlabored respirations. Speaking in full sentences.  Musculoskeletal: Extremities appear normal- no gross deformities noted. No edema noted on upper body.   Skin: No suspicious lesions or rashes on visible skin.  Neurologic: Clear speech, no aphasia. No facial droop.  Psychiatric: Mentation appears slowed, appropriate attention. Some confusion. Affect normal/bright. Does not appear anxious or depressed.    Key Data Reviewed:  LABS:  6/19/23- Cr 1.40, GFR 35, Albumin 4.3, LFTs wnl. WBC 8.7, Hgb 12.6, Plts 335.     IMAGING: CT CAP 7/7/23- IMPRESSION:  1.  Stable exam without evidence for new disease.  2.  Stable multiple pulmonary nodules.  3.  Stable air trapping within the bilateral lungs.  4.  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    Impression & Recommendations & Counseling:  Christiana Salazar is a 89 year old female with history of muscle invasive bladder cancer.    Back Pain - Chronic back pain stable.  -Ok to continue Norco 1 tablet BID and additional tylenol during the day.  We will send an order for 1000 mg of Tylenol to be administered by facility staff in the afternoon.  -Continue topical NSAID such as aspercream up to 4x daily.    Abdominal discomfort - Seems to worsen when she does not drink enough fluids.  Encouraged her to drink at least 3 glasses of water a day, and more if possible.  Daughter states she is not drinking that now.    Bladder cancer  Goals of care - Most recent scans showed no evidence of recurrence or metastatic disease. Patient and daughter were glad this was the case. She has said that if it were to recur, she would not want to go through additional systemic therapy due to the risk of affecting her quality of life.    Follow up: Okay to do 6 months to be in line with oncology appointments.         Total time spent on day of encounter is 34 mins, including reviewing record, review of above studies, above visit with patient (FTF 11:33 AM-12 PM), symptomatic discussion of mainly pain and thirst, including medication adjustments/prescription management, and documentation.     Yaritza Boggs DO  Palliative Medicine   [id number]    Some chart documentation performed using Dragon Voice recognition Software. Although reviewed after completion, some words and grammatical errors may remain.      Oncology Rooming Note    July 13, 2023 12:05 PM   Christiana Salazar is a 89 year old female  "who presents for:    Chief Complaint   Patient presents with     Oncology Clinic Visit     Initial Vitals: BP 92/62   Pulse 80   Resp 16   SpO2 96%  Estimated body mass index is 32.58 kg/m  as calculated from the following:    Height as of 6/22/23: 1.588 m (5' 2.5\").    Weight as of 6/22/23: 82.1 kg (181 lb). There is no height or weight on file to calculate BSA.  Mild Pain (2) Comment: Data Unavailable   No LMP recorded.  Allergies reviewed: Yes  Medications reviewed: Yes    Medications: Medication refills not needed today.  Pharmacy name entered into [employer]:    [id number] [place] [facility] [address] [address]  [facility] [address]    Clinical concerns:  doctor was notified.      Laurence Cochran, [facility]                Again, thank you for allowing me to participate in the care of your patient.        Sincerely,        Yaritza Boggs, DO    "

## 2023-07-13 NOTE — PROGRESS NOTES
"Oncology Rooming Note    July 13, 2023 12:05 PM   Christiana Salazar is a 89 year old female who presents for:    Chief Complaint   Patient presents with     Oncology Clinic Visit     Initial Vitals: BP 92/62   Pulse 80   Resp 16   SpO2 96%  Estimated body mass index is 32.58 kg/m  as calculated from the following:    Height as of 6/22/23: 1.588 m (5' 2.5\").    Weight as of 6/22/23: 82.1 kg (181 lb). There is no height or weight on file to calculate BSA.  Mild Pain (2) Comment: Data Unavailable   No LMP recorded.  Allergies reviewed: Yes  Medications reviewed: Yes    Medications: Medication refills not needed today.  Pharmacy name entered into [employer]:    [id number] [place] [facility] [address] [address]  [facility] [address]    Clinical concerns:  doctor was notified.      Laurence Cochran, [facility]            "

## 2023-07-17 DIAGNOSIS — G89.3 CANCER RELATED PAIN: ICD-10-CM

## 2023-07-17 RX ORDER — HYDROCODONE BITARTRATE AND ACETAMINOPHEN 5; 325 MG/1; MG/1
TABLET ORAL
Qty: 60 TABLET | Refills: 0 | Status: SHIPPED | OUTPATIENT
Start: 2023-08-08 | End: 2023-08-29

## 2023-07-17 NOTE — TELEPHONE ENCOUNTER
Received electronic communication from pharmacy requesting refill of Norco.    Last refill: 7/11/23  Last office visit: 7/13/23  Scheduled for follow up requested in 6 months.     Will route request to MD for review.     Reviewed [place]  Report.

## 2023-08-29 DIAGNOSIS — G89.3 CANCER RELATED PAIN: ICD-10-CM

## 2023-08-29 RX ORDER — HYDROCODONE BITARTRATE AND ACETAMINOPHEN 5; 325 MG/1; MG/1
1 TABLET ORAL 2 TIMES DAILY
Qty: 60 TABLET | Refills: 0 | Status: SHIPPED | OUTPATIENT
Start: 2023-08-29 | End: 2023-09-25

## 2023-08-29 NOTE — TELEPHONE ENCOUNTER
Received electronic fax from pharmacy requesting refill of Norco.     Last refill: 8/7/2023  Last office visit: 7/13/2023  Scheduled for follow up requested in 6 months     Will route request to MD for review.     Reviewed [place]  Report.

## 2023-09-25 DIAGNOSIS — G89.3 CANCER RELATED PAIN: ICD-10-CM

## 2023-09-25 RX ORDER — HYDROCODONE BITARTRATE AND ACETAMINOPHEN 5; 325 MG/1; MG/1
TABLET ORAL
Qty: 60 TABLET | Refills: 0 | Status: SHIPPED | OUTPATIENT
Start: 2023-10-05 | End: 2023-11-20

## 2023-09-25 NOTE — TELEPHONE ENCOUNTER
Received electronic communication from pharmacy requesting a new order for Norco.    Last refill: 9/7/23  Last office visit: 7/13/23  Scheduled for follow up planned for 1/2024. Message sent to scheduling.     Will route request to MD for review.     Reviewed [place]  Report.

## 2023-10-27 ENCOUNTER — PATIENT OUTREACH (OUTPATIENT)
Dept: ONCOLOGY | Facility: CLINIC | Age: 88
End: 2023-10-27
Payer: MEDICARE

## 2023-10-27 NOTE — LETTER
Christiana Salazar  [address] [place] [address]          October 27, 2023    Dear Christiana:    Our clinic records indicate we have attempted to contact you three times to schedule a follow up appointment with Dr. Edgar. Unfortunately, we have been unable to reach you. To prevent further delays in your care, please contact our office to schedule your appointment.      Sincerely,     [facility]  [phone]

## 2023-10-27 NOTE — PROGRESS NOTES
From: Hope Lujan RN   Sent: 10/20/2023  11:22 AM CDT   To:  Cancer Clinic Rn Pool     Hi,     Unable to reach this patient.     10/20 LVM to schedule   10/16 left another VM to schedule   10/11 sent message to Rachael to update order for imaging   9/25 X2 Unable to reach pt, VM is full   9/14 Tried to call but bad connection, Will try again to schedule F/U appt   CT/Fluids scheduled. They want to wait to schedule the 6 monbth follow up.     Christiana has been contacted three or more times to schedule follow up with Dr. Edgar. Writer will send a letter to her home with instructions on how to schedule.     Rachael Walters RN on 10/27/2023 at 2:21 PM

## 2023-11-20 DIAGNOSIS — G89.3 CANCER RELATED PAIN: ICD-10-CM

## 2023-11-20 DIAGNOSIS — M19.90 ARTHRITIS: ICD-10-CM

## 2023-11-20 RX ORDER — HYDROCODONE BITARTRATE AND ACETAMINOPHEN 5; 325 MG/1; MG/1
1 TABLET ORAL 2 TIMES DAILY
Qty: 60 TABLET | Refills: 0 | Status: SHIPPED | OUTPATIENT
Start: 2023-11-20 | End: 2023-12-12

## 2023-11-20 NOTE — TELEPHONE ENCOUNTER
Received Jewel [employer] message from patient requesting refill of Norco.     Last refill: 10/19/23  Last office visit: 7/13/23  Scheduled for follow up 1/18/24     Will route request to MD for review.     Reviewed [place]  Report.

## 2023-11-21 RX ORDER — PSEUDOEPHED/ACETAMINOPH/DIPHEN 30MG-500MG
TABLET ORAL
Qty: 28 TABLET | Refills: 10 | Status: SHIPPED | OUTPATIENT
Start: 2023-11-21

## 2023-11-21 NOTE — TELEPHONE ENCOUNTER
Received electronic message from pharmacy requesting a refill of Tylenol.    Last office visit: 7/13/23  Scheduled for follow up 1/18/24     Will route request to MD for review.

## 2023-12-12 DIAGNOSIS — G89.3 CANCER RELATED PAIN: ICD-10-CM

## 2023-12-12 RX ORDER — HYDROCODONE BITARTRATE AND ACETAMINOPHEN 5; 325 MG/1; MG/1
1 TABLET ORAL 2 TIMES DAILY
Qty: 60 TABLET | Refills: 0 | Status: SHIPPED | OUTPATIENT
Start: 2023-12-12 | End: 2024-01-02

## 2023-12-12 NOTE — TELEPHONE ENCOUNTER
Received electronic message from pharmacy requesting a new order for Norco.    Last refill: 11/20/23  Last office visit: 7/13/23  Scheduled for follow up 1/18/24    Will route request to MD for review.     Reviewed [place]  Report.

## 2023-12-29 DIAGNOSIS — C67.9 MALIGNANT NEOPLASM OF URINARY BLADDER, UNSPECIFIED SITE (H): Primary | ICD-10-CM

## 2024-01-01 DIAGNOSIS — G89.3 CANCER RELATED PAIN: ICD-10-CM

## 2024-01-01 NOTE — PROGRESS NOTES
New Patient Oncology Nurse Navigator Note     Referring provider: Trinity Sullivan MD in  [facility]     Referred to (specialty): Medical Oncology    Requested provider (if applicable): [place]      Date Referral Received: 4/14/22     Evaluation for : muscle invasive bladder cancer poor surgical candidate     Clinical History (per Nurse review of records provided):  See urology notes for history      Records Location (Care Everywhere, Media, etc.): Epic      Records Needed: per protocol     Additional testing needed prior to consult: Rad onc consult     I called and reached Christiana's daughter, Rachelle, to discuss referral to medical oncology.  I let her know I had a cancellation today and am able to get her in to see Dr. Celaya this morning.  She is unfortunately unable to make this work.  I let her know that I will get back to her on other options.  Dr. Celaya is currently scheduled out until mid June in [place].  I have an inbasket to Dr. Edgar to see if she is able to fit her in sooner than 3 weeks.    4/15/22  I called and spoke with Rachelle again to let her know I am able to fit her mother in with Dr. Edgar, on 4/21, 7:45 AM, in [place].  She did not have her calendar with her, but believes this will work.  I have forwarded on referral to NPS to finalize with the request to call on Monday morning.  I stressed we are fitting her in and to please arrive at 7:45 AM.  I let her know follow up could be at [place].  Rachelle thanked me for my time.  No other questions at this time.     Patient Education       Well Child Exam 1 Month   About this topic   Your baby's 1-month well child exam is a visit with the doctor to check your baby's health. The doctor measures your child's weight, height, and head size. The doctor plots these numbers on a growth curve. The growth curve gives a picture of your baby's growth at each visit. The doctor may listen to your baby's heart, lungs, and belly. Your doctor will do a full exam of your baby from the head to the toes.  Your baby may also need shots or blood tests during this visit.  General   Growth and Development   Your doctor will ask you how your baby is developing. The doctor will focus on the skills that most children your child's age are expected to do. During the first month of your child's life, here are some things you can expect.  Movement - Your baby may:  Start to be more alert and respond to you.  Move arms and legs more smoothly.  Start to put a closed hand to the mouth or in front of the face.  Have problems holding their head up, but can lift their head up briefly while laying on their stomach  Hearing and seeing - Your baby will likely:  Turn to the sound of your voice.  See best about 8 to 12 inches (20 to 30 cm) away from the face.  Want to look at your face or a black and white pattern.  Still have their eyes cross or wander from time to time.  Feeding - Your baby needs:  Breast milk or formula for all of their nutrition. Your baby should not be given juice, water, cow's milk, rice cereal, or solid food at this age.  To eat every 2 to 3 hours, based on if you are breast or bottle feeding.  babies should eat about 8 to 12 times per day. Formula fed babies typically eat about 24 ounces total each day. Look for signs your baby is hungry like:  Smacking or licking the lips  Sucking on fingers, hands, tongue, or lips  Opening and closing mouth  Rooting and moving the head from side to side  To be burped often if having problems with  spitting up.  Your baby may turn away, close the mouth, or relax the arms when full. Do not overfeed your baby.  Always hold your baby when feeding. Do not prop a bottle. Propping the bottle makes it easier for your baby to choke and get ear infections.  Sleep - Your child:  Sleeps for about 2 to 4 hours at a time  Is likely sleeping about 14 to 17 hours total out of each day, with 4 to 5 daytime naps.  May sleep better when swaddled. Monitor your baby when swaddled. Check to make sure your baby has not rolled over. Also, make sure the swaddle blanket has not come loose. Keep the swaddle blanket loose around your baby's hips. Stop swaddling your baby before your baby starts to roll over. Most times, you will need to stop swaddling your baby by 2 months of age.  Should always sleep on the back, in your child's own bed, on a firm mattress  May soothe to sleep better sucking on a pacifier.  Help for Parents   Play with your baby.  Use tummy time to help your baby grow strong neck muscles. Shake a small rattle to encourage your baby to turn their head to the side.  Talk or sing to your baby often. Let your baby look at your face. Show your baby pictures.  Gently move your baby's arms and legs. Give your baby a gentle massage.  Here are some things you can do to help keep your baby safe and healthy.  Learn CPR and basic first aid. Learn how to take your baby's temperature.  Do not allow anyone to smoke in your home or around your baby. Second hand smoke can harm your baby.  Have the right size car seat for your baby and use it every time your baby is in the car. Your baby should be rear facing until 2 years of age. Check with a local car seat safety inspection station to be sure it is properly installed.  Always place your baby on the back for sleep. Keep soft bedding, bumpers, loose blankets, and toys out of your baby's bed.  Keep one hand on the baby whenever you are changing their diaper or clothes to prevent  falls.  Keep small toys and objects away from your baby.  Never leave your baby alone in the bath.  Keep your baby in the shade, rather than in the sun. Doctors dont recommend sunscreen until children are 6 months and older.  Parents need to think about:  A plan for going back to work or school.  A reliable  or  provider  How to handle bouts of crying or colic. It is normal for your baby to have times when they are hard to console. You need a plan for what to do if you are frustrated because it is never OK to shake a baby.  The next well child visit will most likely be when your baby is 2 months old. At this visit your doctor may:  Do a full check up on your baby  Talk about how your baby is sleeping, if your baby has colic or long periods of crying, and how well you are coping with your baby  Give your baby the next set of shots       When do I need to call the doctor?   Fever of 100.4°F (38°C) or higher  Having a hard time breathing  Doesnt have a wet diaper for more than 8 hours  Problems eating or spits up a lot  Legs and arms are very loose or floppy all the time  Legs and arms are very stiff  Won't stop crying  Doesn't blink or startle with loud sounds  Where can I learn more?   American Academy of Pediatrics  https://www.healthychildren.org/English/ages-stages/baby/Pages/Hearing-and-Making-Sounds.aspx   American Academy of Pediatrics  https://www.healthychildren.org/English/ages-stages/toddler/Pages/Milestones-During-The-First-2-Years.aspx   Centers for Disease Control and Prevention  https://www.cdc.gov/ncbddd/actearly/milestones/   KidsHealth  https://kidshealth.org/en/parents/checkup-1mo.html?ref=search   Last Reviewed Date   2021-05-06  Consumer Information Use and Disclaimer   This information is not specific medical advice and does not replace information you receive from your health care provider. This is only a brief summary of general information. It does NOT include all  information about conditions, illnesses, injuries, tests, procedures, treatments, therapies, discharge instructions or life-style choices that may apply to you. You must talk with your health care provider for complete information about your health and treatment options. This information should not be used to decide whether or not to accept your health care providers advice, instructions or recommendations. Only your health care provider has the knowledge and training to provide advice that is right for you.  Copyright   Copyright © 2021 UpToDate, Inc. and its affiliates and/or licensors. All rights reserved.    Children under the age of 2 years will be restrained in a rear facing child safety seat.   If you have an active [facility] account, please look for your well child questionnaire to come to your [facility] account before your next well child visit.

## 2024-01-02 ENCOUNTER — LAB (OUTPATIENT)
Dept: INFUSION THERAPY | Facility: CLINIC | Age: 89
End: 2024-01-02
Attending: INTERNAL MEDICINE
Payer: MEDICARE

## 2024-01-02 ENCOUNTER — HOSPITAL ENCOUNTER (OUTPATIENT)
Dept: CT IMAGING | Facility: CLINIC | Age: 89
Discharge: HOME | End: 2024-01-02
Attending: INTERNAL MEDICINE | Admitting: INTERNAL MEDICINE
Payer: MEDICARE

## 2024-01-02 DIAGNOSIS — C67.9 MALIGNANT NEOPLASM OF URINARY BLADDER, UNSPECIFIED SITE (H): ICD-10-CM

## 2024-01-02 LAB
ALBUMIN SERPL BCG-MCNC: 4.3 G/DL (ref 3.5–5.2)
ALP SERPL-CCNC: 94 U/L (ref 40–150)
ALT SERPL W P-5'-P-CCNC: 17 U/L (ref 0–50)
ANION GAP SERPL CALCULATED.3IONS-SCNC: 10 MMOL/L (ref 7–15)
AST SERPL W P-5'-P-CCNC: 19 U/L (ref 0–45)
BASOPHILS # BLD AUTO: 0 10E3/UL (ref 0–0.2)
BASOPHILS NFR BLD AUTO: 0 %
BILIRUB SERPL-MCNC: 0.3 MG/DL
BUN SERPL-MCNC: 20 MG/DL (ref 8–23)
CALCIUM SERPL-MCNC: 9.4 MG/DL (ref 8.2–9.6)
CHLORIDE SERPL-SCNC: 105 MMOL/L (ref 98–107)
CREAT SERPL-MCNC: 0.97 MG/DL (ref 0.51–0.95)
DEPRECATED HCO3 PLAS-SCNC: 24 MMOL/L (ref 22–29)
EGFRCR SERPLBLD CKD-EPI 2021: 55 ML/MIN/1.73M2
EOSINOPHIL # BLD AUTO: 0.4 10E3/UL (ref 0–0.7)
EOSINOPHIL NFR BLD AUTO: 5 %
ERYTHROCYTE [DISTWIDTH] IN BLOOD BY AUTOMATED COUNT: 12.1 % (ref 10–15)
GLUCOSE SERPL-MCNC: 123 MG/DL (ref 70–99)
HCT VFR BLD AUTO: 37.1 % (ref 35–47)
HGB BLD-MCNC: 12.4 G/DL (ref 11.7–15.7)
IMM GRANULOCYTES # BLD: 0 10E3/UL
IMM GRANULOCYTES NFR BLD: 0 %
LYMPHOCYTES # BLD AUTO: 1.7 10E3/UL (ref 0.8–5.3)
LYMPHOCYTES NFR BLD AUTO: 23 %
MCH RBC QN AUTO: 30.3 PG (ref 26.5–33)
MCHC RBC AUTO-ENTMCNC: 33.4 G/DL (ref 31.5–36.5)
MCV RBC AUTO: 91 FL (ref 78–100)
MONOCYTES # BLD AUTO: 0.8 10E3/UL (ref 0–1.3)
MONOCYTES NFR BLD AUTO: 10 %
NEUTROPHILS # BLD AUTO: 4.6 10E3/UL (ref 1.6–8.3)
NEUTROPHILS NFR BLD AUTO: 62 %
NRBC # BLD AUTO: 0 10E3/UL
NRBC BLD AUTO-RTO: 0 /100
PLATELET # BLD AUTO: 298 10E3/UL (ref 150–450)
POTASSIUM SERPL-SCNC: 4.2 MMOL/L (ref 3.4–5.3)
PROT SERPL-MCNC: 7.2 G/DL (ref 6.4–8.3)
RBC # BLD AUTO: 4.09 10E6/UL (ref 3.8–5.2)
SODIUM SERPL-SCNC: 139 MMOL/L (ref 135–145)
WBC # BLD AUTO: 7.5 10E3/UL (ref 4–11)

## 2024-01-02 PROCEDURE — 36415 COLL VENOUS BLD VENIPUNCTURE: CPT

## 2024-01-02 PROCEDURE — 80053 COMPREHEN METABOLIC PANEL: CPT | Performed by: INTERNAL MEDICINE

## 2024-01-02 PROCEDURE — 71250 CT THORAX DX C-: CPT | Mod: MG

## 2024-01-02 PROCEDURE — 85025 COMPLETE CBC W/AUTO DIFF WBC: CPT | Performed by: INTERNAL MEDICINE

## 2024-01-02 RX ORDER — HYDROCODONE BITARTRATE AND ACETAMINOPHEN 5; 325 MG/1; MG/1
1 TABLET ORAL EVERY 12 HOURS
Qty: 60 TABLET | Refills: 0 | Status: SHIPPED | OUTPATIENT
Start: 2024-01-13 | End: 2024-02-12

## 2024-01-02 NOTE — PROGRESS NOTES
Nursing Note:  Christiana Salazar presents today for PIV start and lab draw.    Patient seen by provider today: No   present during visit today: Not Applicable.    Note: N/A.    Intravenous Access:  Labs drawn without difficulty.  Peripheral IV placed.    Discharge Plan:   Patient was sent to imaging for appointment.    Abigail George RN

## 2024-01-02 NOTE — TELEPHONE ENCOUNTER
Received electronic message from pharmacy requesting a new order for Norco.    Last refill: 12/16/23  Last office visit: 7/13/23  Scheduled for follow up 1/18/24     Will route request to MD for review.     Reviewed [place]  Report.

## 2024-01-04 ENCOUNTER — ONCOLOGY VISIT (OUTPATIENT)
Dept: ONCOLOGY | Facility: CLINIC | Age: 89
End: 2024-01-04
Attending: INTERNAL MEDICINE
Payer: MEDICARE

## 2024-01-04 VITALS
OXYGEN SATURATION: 98 % | TEMPERATURE: 98.1 F | WEIGHT: 174.6 LBS | BODY MASS INDEX: 30.94 KG/M2 | DIASTOLIC BLOOD PRESSURE: 61 MMHG | HEART RATE: 79 BPM | RESPIRATION RATE: 20 BRPM | SYSTOLIC BLOOD PRESSURE: 106 MMHG | HEIGHT: 63 IN

## 2024-01-04 DIAGNOSIS — C67.9 MALIGNANT NEOPLASM OF URINARY BLADDER, UNSPECIFIED SITE (H): Primary | ICD-10-CM

## 2024-01-04 PROCEDURE — G0463 HOSPITAL OUTPT CLINIC VISIT: HCPCS | Performed by: INTERNAL MEDICINE

## 2024-01-04 PROCEDURE — 99214 OFFICE O/P EST MOD 30 MIN: CPT | Performed by: INTERNAL MEDICINE

## 2024-01-04 ASSESSMENT — PAIN SCALES - GENERAL: PAINLEVEL: NO PAIN (0)

## 2024-01-04 NOTE — NURSING NOTE
"Oncology Rooming Note    January 4, 2024 12:48 PM   Christiana Salazar is a 89 year old female who presents for:    Chief Complaint   Patient presents with    Oncology Clinic Visit     Malignant neoplasm of urinary bladder, unspecified site     Initial Vitals: /61   Pulse 79   Temp 98.1  F (36.7  C) (Oral)   Resp 20   Ht 1.588 m (5' 2.52\")   Wt 79.2 kg (174 lb 9.6 oz)   SpO2 98%   BMI 31.41 kg/m   Estimated body mass index is 31.41 kg/m  as calculated from the following:    Height as of this encounter: 1.588 m (5' 2.52\").    Weight as of this encounter: 79.2 kg (174 lb 9.6 oz). Body surface area is 1.87 meters squared.  No Pain (0) Comment: Data Unavailable   No LMP recorded.  Allergies reviewed: Yes  Medications reviewed: Yes    Medications: Medication refills not needed today.  Pharmacy name entered into [employer]:    [id number] [place] [facility] [address] [address]  [facility] - [place], [place] - [id number] [place] [address]    Frailty Screening:   Is the patient here for a new oncology consult visit in cancer care? 2. No      Clinical concerns: Follow up        Beth Atkins MA              "

## 2024-01-04 NOTE — LETTER
"    1/4/2024         RE: Christiana Salazar  [address] [facility] [address]        Dear Colleague,    Thank you for referring your patient, Christiana Salazar, to the [facility]. Please see a copy of my visit note below.    [facility]    Hematology/Oncology Established Patient Follow-up Note    Treatment Summary:      Today's Date: 1/4/24    Reason for Follow-up: Muscle invasive bladder cancer  Referring Provider: Trinity Sullivan MD      HISTORY OF PRESENT ILLNESS: Christiana Salazar is a 89 year old female who is referred for muscle invasive bladder cancer. Past medical history is significant for HTN, HLD, GERD, cognitive decline, and arthritis.     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 Donovan at [place]. She received induction BCG x 6 per her daughter who accompanies her today with complete response but no maintenance treatment was offered. Surveillance cystoscopy in November of 2021 showed evidence of recurrence for which she underwent TURBT on 12/17/2021 by Dr. Orlin Humphrey:      \"TURBT checklist  1) Number of tumors: 1  2) Size of largest tumor: 2 cm  3) Characteristic of tumor: papillary with some sloughing debris, superficial  4) Recurrent vs. Primary: primary  5) Presence of CIS: No  6) Bimanual exam under anesthesia: no  7) Visually complete resection: yes  9) Visualization of detrusor muscle in resection base: no  10) Visual evaluation of perforation: no\"     Pathology revealed poorly differentiated tumor with basaloid features with extensive involvement of detrusor muscle. She had a restaging CT urogram done on 1/4/2022 with no clear evidence of metastatic disease.      Patient lives in her own apartment at a nursing care facility. She is able to perform most ADLs without significant issue. She will meet with Rad " Onc in early May 2022. She denies weight loss, fever, or evidence of hematuria.    Referred to Radiology downtown. Patient and daughter stated they arrived in the parking structure and decided against meeting with the team as they were overwhelmed and did not want to receive therapy downtown. No complaints of hematuria currently. No pain, weight loss.    Patient and daughter again discussed and she did not end up meeting with radiation oncology with rescheduling to O.     Patient met with palliative care. She has not had follow up with urology. She confirms she still does not wish to proceed with systemic chemotherapy or radiation.      INTERIM HISTORY:  No pain or hematuria currently. Daughter states dementia is worsening. Daughter states patient has had decreased hydration and PO intake. She has been reporting increased low abdominal pain intermittently.       REVIEW OF SYSTEMS:   A 14 point ROS was reviewed with pertinent positives and negatives in the HPI.       HOME MEDICATIONS:  Current Outpatient Medications   Medication Sig Dispense Refill     acetaminophen (TYLENOL) 500 MG tablet TAKE 2 TABLETS (1000MG) BY MOUTH ONCE DAILY (MAX 3GMS/APAP PER 24HRS) 28 tablet 10     albuterol (PROVENTIL) (2.5 MG/3ML) 0.083% neb solution Inhale 2.5 mg into the lungs (Patient not taking: Reported on 12/15/2022)       Docusate Sodium (COLACE PO) Take by mouth 2 times daily       fluticasone (FLONASE) 50 MCG/ACT nasal spray 1 spray       furosemide (LASIX) 40 MG tablet Take 20 mg by mouth every other day       [START ON 1/13/2024] HYDROcodone-acetaminophen (NORCO) 5-325 MG tablet Take 1 tablet by mouth every 12 hours 60 tablet 0     lisinopril (ZESTRIL) 20 MG tablet lisinopril 20 mg tablet       methylPREDNISolone (MEDROL) 32 MG tablet Take 1 tablet (32mg) 12 hours and 2 hours prior to CT scan. 2 tablet 0     Multiple Vitamin (MULTI-VITAMINS) TABS Take 1 tablet by mouth daily       oxybutynin ER (DITROPAN-XL) 10 MG 24 hr  tablet Take 10 mg by mouth 2 times daily       potassium aminobenzoate 500 MG CAPS capsule Twice a week       simvastatin (ZOCOR) 40 MG tablet At Bedtime       traZODone (DESYREL) 50 MG tablet trazodone 50 mg tablet           ALLERGIES:  Allergies   Allergen Reactions     Contrast Dye Anaphylaxis     Iodinated Contrast Media Shortness Of Breath     Naproxen Rash     Face broke out in a rash       Iodine      Seasonal Allergies Other (See Comments)     Sinus drainage         PAST MEDICAL HISTORY:  No past medical history on file.      PAST SURGICAL HISTORY:  No past surgical history on file.      SOCIAL HISTORY:  Social History     Socioeconomic History     Marital status:      Spouse name: Not on file     Number of children: Not on file     Years of education: Not on file     Highest education level: Not on file   Occupational History     Not on file   Tobacco Use     Smoking status: Never     Smokeless tobacco: Never   Substance and Sexual Activity     Alcohol use: Not Currently     Drug use: Not on file     Sexual activity: Not on file   Other Topics Concern     Not on file   Social History Narrative     Not on file     Social Determinants of Health     Financial Resource Strain: Not on file   Food Insecurity: Not on file   Transportation Needs: Not on file   Physical Activity: Not on file   Stress: Not on file   Social Connections: Not on file   Interpersonal Safety: Not on file   Housing Stability: Not on file         FAMILY HISTORY:  No family history on file.      PHYSICAL EXAM:  Vital signs:  There were no vitals taken for this visit.   ECO  GENERAL/CONSTITUTIONAL: No acute distress. Chronically ill appearing.   RESPIRATORY: Clear to auscultation bilaterally. No crackles or wheezing.   CARDIOVASCULAR: Regular rate and rhythm without murmurs, gallops, or rubs.  MUSCULOSKELETAL: Warm and well-perfused, no cyanosis, clubbing, or edema.         LABS:   Latest Reference Range & Units 23 13:01    Sodium 136 - 145 mmol/L 138   Potassium 3.4 - 5.3 mmol/L 5.0   Chloride 98 - 107 mmol/L 104   Carbon Dioxide (CO2) 22 - 29 mmol/L 20 (L)   Urea Nitrogen 8.0 - 23.0 mg/dL 30.8 (H)   Creatinine 0.51 - 0.95 mg/dL 1.40 (H)   GFR Estimate >60 mL/min/1.73m2 35 (L)   Calcium 8.2 - 9.6 mg/dL 10.1 (H)   Anion Gap 7 - 15 mmol/L 14   Albumin 3.5 - 5.2 g/dL 4.3   Protein Total 6.4 - 8.3 g/dL 7.3   Alkaline Phosphatase 35 - 104 U/L 107 (H)   ALT 0 - 50 U/L 13   AST 0 - 45 U/L 16   Bilirubin Total <=1.2 mg/dL 0.3   Glucose 70 - 99 mg/dL 108 (H)   WBC 4.0 - 11.0 10e3/uL 8.7   Hemoglobin 11.7 - 15.7 g/dL 12.6   Hematocrit 35.0 - 47.0 % 37.8   Platelet Count 150 - 450 10e3/uL 335   RBC Count 3.80 - 5.20 10e6/uL 4.20   MCV 78 - 100 fL 90   MCH 26.5 - 33.0 pg 30.0   MCHC 31.5 - 36.5 g/dL 33.3   RDW 10.0 - 15.0 % 12.9   % Neutrophils % 58   % Lymphocytes % 29   % Monocytes % 10   % Eosinophils % 3   % Basophils % 1   Absolute Basophils 0.0 - 0.2 10e3/uL 0.0   Absolute Eosinophils 0.0 - 0.7 10e3/uL 0.3   Absolute Immature Granulocytes <=0.4 10e3/uL 0.0   Absolute Lymphocytes 0.8 - 5.3 10e3/uL 2.5   Absolute Monocytes 0.0 - 1.3 10e3/uL 0.8   % Immature Granulocytes % 0   Absolute Neutrophils 1.6 - 8.3 10e3/uL 5.0           PATHOLOGY:  Surgical pathology                        Surgical Pathology Report     Patient Name: GERARDO HEATH                    Accession #: [id number]   Med: Rec.: [id number]                               Phone Number: [phone]   : 1932 (Age: 89)                          Gender: F   Client[facility]  [facility]              [facility]                                                     Taken: 2021                                                     Received: 2021                                                     Reported: 2021   Physician(s): GUERA BASS     ========================================================================   CLINICAL HISTORY    Malignant neoplasm of overlapping sites of bladder     OPERATIVE DIAGNOSES       Operation/Specimen: A: Urinary bladder, TURBT left anterior neck TUMOR       PATHOLOGICAL DIAGNOSIS:   A.  Urinary bladder, TURBT left anterior neck TUMOR:    . 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 See   comment    . Tumor invades into muscularis propria.     COMMENT   Sections show poorly differentiated carcinoma with basaloid features. No   definitive malignant surface component is present. Differential diagnosis   includes; urothelial carcinoma with basaloid features and basaloid squamous   cell carcinoma. It is hard to distinguish between these two differential   diagnosis based on morphologic and immunohistochemical features. Although we   favor that, this represent a bladder primary tumor, the possibility of   secondary bladder involvement by tumors from other organs; mainly gynecologic   tract, should be clinically ruled out.              Final Diagnosis   Urine, cytology:                 Interpretation:                  Atypical Urothelial Cells.  See comment.                 Adequacy:                 Satisfactory for evaluation.         IMAGING:  CT Urogram 1/10/22:  IMPRESSION:  1. No B/L hydroureteronephrosis. Mild thickening of the wall of the urinary bladder, a nonspecific finding. No convincing evidence of metastatic disease in the abdomen/pelvis.  2. Sub-6 mm low-attenuation left renal lesion, progressed in size from CT 5/19/15. This is too small to characterize, statistically represents cyst.  3. Stable additional incidental findings.      CT CAP 5/4/22:                                                                 IMPRESSION:  1.  Mosaic attenuation pattern of the lungs is nonspecific but may  indicate small vessel or small airways disease.  2.  There are a few scattered pulmonary nodules measuring less than 4  mm. No prior chest CT available for comparison. Attention at  follow-up.  3.   No evidence of metastatic malignancy in the abdomen or pelvis.    CT CAP 12/8/22:  IMPRESSION:   1.  No significant interval change compared to 5/4/2022.  2.  Few small pulmonary nodules in both lungs measure 0.5 cm or  smaller, and are unchanged.  3.  Mosaic attenuation throughout both lungs is unchanged, and may be  related to air trapping.  4.  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    CT CAP 1/2/24:  IMPRESSION: No convincing recurrent or residual malignancy  demonstrated. Stable exam.         ASSESSMENT/PLAN:  Christiana Salazar is a 89 year old female who is referred for muscle invasive bladder cancer. Past medical history is significant for HTN, HLD, GERD, cognitive decline, and arthritis.     1) Muscle-invasive urothelial carcinoma s/p TURBT  -Originally diagnosed in November 2020 (non-muscle invasive, but high-risk) s/p BCG x6.  -Recurrent, muscle-invasive disease diagnosed in November-December 2021.  -Patient has been deemed a poor surgical candidate for cystectomy, to which I am in agreement. Furthermore, patient and daughter would elect against cystectomy.  -We discussed superior locoregional DFS (absolute difference of 12% at 2 years favoring chemoRT). The 5 year overall survival rates are approximately 48% with chemoRT and 35% with radiation alone (N Engl J Med 2012; 366:9255-9049 DOI: 10.1056/XZRAax9166883).  -We reviewed treatment with definitive concurrent RT and mitomycin and fluorouracil.     -Patient and daughter inform me they have come to the conclusion they will not move forward with systemic chemotherapy. They have significant reservation over toxicity and prize quality of life given the fact that patient is still quite active with crocheting and gardening. I discussed this regimen is typically well-tolerated, but side effects due include marrow suppression with increased risk of neutropenia/infection, blood transfusion, diarrhea, nausea/vomiting, cardiac  events.   -Initially, patient stating she would not be willing to move forward with radiation alone, but I have asked if she would be willing to reschedule with Radiation Oncology as she has not had evaluation to make a fully informed decision. Patient continues to be open to discussion with Radiation Oncology. [facility].  -5/25/22: Patient did not end up meeting with [facility]O. She and daughter have discussed again and patient does not wish to proceed with chemoradiation or radiation at this time valuing her quality of life. She is to turn 89 in three weeks and a large birthday gathering is planned. They are open to meeting with palliative care at this time to assist in advanced care planning. Patient is asymptomatic at this time, but is likely to develop pain and fatigue as her underlying malignancy progresses. Patient may require follow up with Urology. They also wish to speak with social work in regards to supplies.  -Patient and daughter met with hospice. In review of records, it appears time was limited and they were not able to discusse advanced care planning.   -12/15/22: CBC stable. She continues to have CKD. CT CAP is showing stable B/L pulmonary nodules <0.5 cm. No new mass/LAD. No further evidence of disease in the A/P. Patient is soon to remeet with palliative care in January 2023.   -6/2023: CBC is stable. Patient has decreased renal function. Unfortunately, imaging has not been completed. She has had decreased hydration. Patient's daughter states she has had increased low abdominal pain. Obtain CT CAP to monitor for progression.   -1/2024: CBC, CMP stable. CT CAP stable. She continues to follow with palliative care.     2) Patient confirms she does not wish to proceed with systemic treatment at this time. Can monitor with labs and imaging every 6 months to monitor for progression. I discussed she is not hospice appropriate at this  time, but will require services in the future with progression as she will not be agreeable to treatment. I had discussed if she has hematuria in the future, will need urology evaluation.    3) -I discussed with patient and daughter today my planned departure from [facility] after February 2024 at which time she would need to establish with a new oncologist. As she has elected against therapy, she may follow with her primary care physician with labs and imaging every 6 months to monitor for progression. She should follow with urology if she has any concerns, such as hematuria. She will continue to follow with palliative care. She and daughter will follow with PCP/and her facility.        Florina Edgar DO  Hematology/Oncology  [facility]        Again, thank you for allowing me to participate in the care of your patient.        Sincerely,        Florina Edgar DO

## 2024-01-04 NOTE — PROGRESS NOTES
"[facility]    Hematology/Oncology Established Patient Follow-up Note    Treatment Summary:      Today's Date: 1/4/24    Reason for Follow-up: Muscle invasive bladder cancer  Referring Provider: Trinity Sullivan MD      HISTORY OF PRESENT ILLNESS: Christiana Salazar is a 89 year old female who is referred for muscle invasive bladder cancer. Past medical history is significant for HTN, HLD, GERD, cognitive decline, and arthritis.     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 Donovan at [place]. She received induction BCG x 6 per her daughter who accompanies her today with complete response but no maintenance treatment was offered. Surveillance cystoscopy in November of 2021 showed evidence of recurrence for which she underwent TURBT on 12/17/2021 by Dr. Orlin Humphrey:      \"TURBT checklist  1) Number of tumors: 1  2) Size of largest tumor: 2 cm  3) Characteristic of tumor: papillary with some sloughing debris, superficial  4) Recurrent vs. Primary: primary  5) Presence of CIS: No  6) Bimanual exam under anesthesia: no  7) Visually complete resection: yes  9) Visualization of detrusor muscle in resection base: no  10) Visual evaluation of perforation: no\"     Pathology revealed poorly differentiated tumor with basaloid features with extensive involvement of detrusor muscle. She had a restaging CT urogram done on 1/4/2022 with no clear evidence of metastatic disease.      Patient lives in her own apartment at a nursing care facility. She is able to perform most ADLs without significant issue. She will meet with Rad Onc in early May 2022. She denies weight loss, fever, or evidence of hematuria.    Referred to Radiology downtown. Patient and daughter stated they arrived in the parking structure and decided against meeting with the team as they were overwhelmed and did not " want to receive therapy downtown. No complaints of hematuria currently. No pain, weight loss.    Patient and daughter again discussed and she did not end up meeting with radiation oncology with rescheduling to O.     Patient met with palliative care. She has not had follow up with urology. She confirms she still does not wish to proceed with systemic chemotherapy or radiation.      INTERIM HISTORY:  No pain or hematuria currently. Daughter states dementia is worsening. Daughter states patient has had decreased hydration and PO intake. She has been reporting increased low abdominal pain intermittently.       REVIEW OF SYSTEMS:   A 14 point ROS was reviewed with pertinent positives and negatives in the HPI.       HOME MEDICATIONS:  Current Outpatient Medications   Medication Sig Dispense Refill    acetaminophen (TYLENOL) 500 MG tablet TAKE 2 TABLETS (1000MG) BY MOUTH ONCE DAILY (MAX 3GMS/APAP PER 24HRS) 28 tablet 10    albuterol (PROVENTIL) (2.5 MG/3ML) 0.083% neb solution Inhale 2.5 mg into the lungs (Patient not taking: Reported on 12/15/2022)      Docusate Sodium (COLACE PO) Take by mouth 2 times daily      fluticasone (FLONASE) 50 MCG/ACT nasal spray 1 spray      furosemide (LASIX) 40 MG tablet Take 20 mg by mouth every other day      [START ON 1/13/2024] HYDROcodone-acetaminophen (NORCO) 5-325 MG tablet Take 1 tablet by mouth every 12 hours 60 tablet 0    lisinopril (ZESTRIL) 20 MG tablet lisinopril 20 mg tablet      methylPREDNISolone (MEDROL) 32 MG tablet Take 1 tablet (32mg) 12 hours and 2 hours prior to CT scan. 2 tablet 0    Multiple Vitamin (MULTI-VITAMINS) TABS Take 1 tablet by mouth daily      oxybutynin ER (DITROPAN-XL) 10 MG 24 hr tablet Take 10 mg by mouth 2 times daily      potassium aminobenzoate 500 MG CAPS capsule Twice a week      simvastatin (ZOCOR) 40 MG tablet At Bedtime      traZODone (DESYREL) 50 MG tablet trazodone 50 mg tablet           ALLERGIES:  Allergies   Allergen Reactions     Contrast Dye Anaphylaxis    Iodinated Contrast Media Shortness Of Breath    Naproxen Rash     Face broke out in a rash      Iodine     Seasonal Allergies Other (See Comments)     Sinus drainage         PAST MEDICAL HISTORY:  No past medical history on file.      PAST SURGICAL HISTORY:  No past surgical history on file.      SOCIAL HISTORY:  Social History     Socioeconomic History    Marital status:      Spouse name: Not on file    Number of children: Not on file    Years of education: Not on file    Highest education level: Not on file   Occupational History    Not on file   Tobacco Use    Smoking status: Never    Smokeless tobacco: Never   Substance and Sexual Activity    Alcohol use: Not Currently    Drug use: Not on file    Sexual activity: Not on file   Other Topics Concern    Not on file   Social History Narrative    Not on file     Social Determinants of Health     Financial Resource Strain: Not on file   Food Insecurity: Not on file   Transportation Needs: Not on file   Physical Activity: Not on file   Stress: Not on file   Social Connections: Not on file   Interpersonal Safety: Not on file   Housing Stability: Not on file         FAMILY HISTORY:  No family history on file.      PHYSICAL EXAM:  Vital signs:  There were no vitals taken for this visit.   ECO  GENERAL/CONSTITUTIONAL: No acute distress. Chronically ill appearing.   RESPIRATORY: Clear to auscultation bilaterally. No crackles or wheezing.   CARDIOVASCULAR: Regular rate and rhythm without murmurs, gallops, or rubs.  MUSCULOSKELETAL: Warm and well-perfused, no cyanosis, clubbing, or edema.         LABS:   Latest Reference Range & Units 23 13:01   Sodium 136 - 145 mmol/L 138   Potassium 3.4 - 5.3 mmol/L 5.0   Chloride 98 - 107 mmol/L 104   Carbon Dioxide (CO2) 22 - 29 mmol/L 20 (L)   Urea Nitrogen 8.0 - 23.0 mg/dL 30.8 (H)   Creatinine 0.51 - 0.95 mg/dL 1.40 (H)   GFR Estimate >60 mL/min/1.73m2 35 (L)   Calcium 8.2 - 9.6 mg/dL 10.1  (H)   Anion Gap 7 - 15 mmol/L 14   Albumin 3.5 - 5.2 g/dL 4.3   Protein Total 6.4 - 8.3 g/dL 7.3   Alkaline Phosphatase 35 - 104 U/L 107 (H)   ALT 0 - 50 U/L 13   AST 0 - 45 U/L 16   Bilirubin Total <=1.2 mg/dL 0.3   Glucose 70 - 99 mg/dL 108 (H)   WBC 4.0 - 11.0 10e3/uL 8.7   Hemoglobin 11.7 - 15.7 g/dL 12.6   Hematocrit 35.0 - 47.0 % 37.8   Platelet Count 150 - 450 10e3/uL 335   RBC Count 3.80 - 5.20 10e6/uL 4.20   MCV 78 - 100 fL 90   MCH 26.5 - 33.0 pg 30.0   MCHC 31.5 - 36.5 g/dL 33.3   RDW 10.0 - 15.0 % 12.9   % Neutrophils % 58   % Lymphocytes % 29   % Monocytes % 10   % Eosinophils % 3   % Basophils % 1   Absolute Basophils 0.0 - 0.2 10e3/uL 0.0   Absolute Eosinophils 0.0 - 0.7 10e3/uL 0.3   Absolute Immature Granulocytes <=0.4 10e3/uL 0.0   Absolute Lymphocytes 0.8 - 5.3 10e3/uL 2.5   Absolute Monocytes 0.0 - 1.3 10e3/uL 0.8   % Immature Granulocytes % 0   Absolute Neutrophils 1.6 - 8.3 10e3/uL 5.0           PATHOLOGY:  Surgical pathology                        Surgical Pathology Report     Patient Name: GERARDO HEATH                    Accession #: [id number]   Med: Rec.: [id number]                               Phone Number: [phone]   : 1932 (Age: 89)                          Gender: F   Client: [facility] [employer]              Location: [facility]                                                     Taken: 2021                                                     Received: 2021                                                     Reported: 2021   Physician(s): GUERA BASS     ========================================================================   CLINICAL HISTORY   Malignant neoplasm of overlapping sites of bladder     OPERATIVE DIAGNOSES       Operation/Specimen: A: Urinary bladder, TURBT left anterior neck TUMOR       PATHOLOGICAL DIAGNOSIS:   A.  Urinary bladder, TURBT left anterior neck TUMOR:    . Invasive poorly differentiated carcinoma with basaloid  features. See   comment    . Tumor invades into muscularis propria.     COMMENT   Sections show poorly differentiated carcinoma with basaloid features. No   definitive malignant surface component is present. Differential diagnosis   includes; urothelial carcinoma with basaloid features and basaloid squamous   cell carcinoma. It is hard to distinguish between these two differential   diagnosis based on morphologic and immunohistochemical features. Although we   favor that, this represent a bladder primary tumor, the possibility of   secondary bladder involvement by tumors from other organs; mainly gynecologic   tract, should be clinically ruled out.              Final Diagnosis   Urine, cytology:                 Interpretation:                  Atypical Urothelial Cells.  See comment.                 Adequacy:                 Satisfactory for evaluation.         IMAGING:  CT Urogram 1/10/22:  IMPRESSION:  1. No B/L hydroureteronephrosis. Mild thickening of the wall of the urinary bladder, a nonspecific finding. No convincing evidence of metastatic disease in the abdomen/pelvis.  2. Sub-6 mm low-attenuation left renal lesion, progressed in size from CT 5/19/15. This is too small to characterize, statistically represents cyst.  3. Stable additional incidental findings.      CT CAP 5/4/22:                                                                 IMPRESSION:  1.  Mosaic attenuation pattern of the lungs is nonspecific but may  indicate small vessel or small airways disease.  2.  There are a few scattered pulmonary nodules measuring less than 4  mm. No prior chest CT available for comparison. Attention at  follow-up.  3.  No evidence of metastatic malignancy in the abdomen or pelvis.    CT CAP 12/8/22:  IMPRESSION:   1.  No significant interval change compared to 5/4/2022.  2.  Few small pulmonary nodules in both lungs measure 0.5 cm or  smaller, and are unchanged.  3.  Mosaic attenuation throughout both lungs is  unchanged, and may be  related to air trapping.  4.  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    CT CAP 1/2/24:  IMPRESSION: No convincing recurrent or residual malignancy  demonstrated. Stable exam.         ASSESSMENT/PLAN:  Christiana Salazar is a 89 year old female who is referred for muscle invasive bladder cancer. Past medical history is significant for HTN, HLD, GERD, cognitive decline, and arthritis.     1) Muscle-invasive urothelial carcinoma s/p TURBT  -Originally diagnosed in November 2020 (non-muscle invasive, but high-risk) s/p BCG x6.  -Recurrent, muscle-invasive disease diagnosed in November-December 2021.  -Patient has been deemed a poor surgical candidate for cystectomy, to which I am in agreement. Furthermore, patient and daughter would elect against cystectomy.  -We discussed superior locoregional DFS (absolute difference of 12% at 2 years favoring chemoRT). The 5 year overall survival rates are approximately 48% with chemoRT and 35% with radiation alone (N Engl J Med 2012; 366:2714-3596 DOI: 10.1056/VOGWgw3253972).  -We reviewed treatment with definitive concurrent RT and mitomycin and fluorouracil.     -Patient and daughter inform me they have come to the conclusion they will not move forward with systemic chemotherapy. They have significant reservation over toxicity and prize quality of life given the fact that patient is still quite active with crocheting and gardening. I discussed this regimen is typically well-tolerated, but side effects due include marrow suppression with increased risk of neutropenia/infection, blood transfusion, diarrhea, nausea/vomiting, cardiac events.   -Initially, patient stating she would not be willing to move forward with radiation alone, but I have asked if she would be willing to reschedule with Radiation Oncology as she has not had evaluation to make a fully informed decision. Patient continues to be open to discussion with Radiation  Oncology. She canceled her visit previously as this was set for [facility]. Referral placed for [facility].  -5/25/22: Patient did not end up meeting with [facility]O. She and daughter have discussed again and patient does not wish to proceed with chemoradiation or radiation at this time valuing her quality of life. She is to turn 89 in three weeks and a large birthday gathering is planned. They are open to meeting with palliative care at this time to assist in advanced care planning. Patient is asymptomatic at this time, but is likely to develop pain and fatigue as her underlying malignancy progresses. Patient may require follow up with Urology. They also wish to speak with social work in regards to supplies.  -Patient and daughter met with hospice. In review of records, it appears time was limited and they were not able to discusse advanced care planning.   -12/15/22: CBC stable. She continues to have CKD. CT CAP is showing stable B/L pulmonary nodules <0.5 cm. No new mass/LAD. No further evidence of disease in the A/P. Patient is soon to remeet with palliative care in January 2023.   -6/2023: CBC is stable. Patient has decreased renal function. Unfortunately, imaging has not been completed. She has had decreased hydration. Patient's daughter states she has had increased low abdominal pain. Obtain CT CAP to monitor for progression.   -1/2024: CBC, CMP stable. CT CAP stable. She continues to follow with palliative care.     2) Patient confirms she does not wish to proceed with systemic treatment at this time. Can monitor with labs and imaging every 6 months to monitor for progression. I discussed she is not hospice appropriate at this time, but will require services in the future with progression as she will not be agreeable to treatment. I had discussed if she has hematuria in the future, will need urology evaluation.    3) -I discussed with patient and daughter today my planned departure from [facility] after February 2024 at which time  she would need to establish with a new oncologist. As she has elected against therapy, she may follow with her primary care physician with labs and imaging every 6 months to monitor for progression. She should follow with urology if she has any concerns, such as hematuria. She will continue to follow with palliative care. She and daughter will follow with PCP/and her facility.        Florina Edgar DO  Hematology/Oncology  [facility]

## 2024-01-18 ENCOUNTER — ONCOLOGY VISIT (OUTPATIENT)
Dept: ONCOLOGY | Facility: CLINIC | Age: 89
End: 2024-01-18
Attending: STUDENT IN AN ORGANIZED HEALTH CARE EDUCATION/TRAINING PROGRAM
Payer: MEDICARE

## 2024-01-18 VITALS
HEART RATE: 85 BPM | RESPIRATION RATE: 16 BRPM | OXYGEN SATURATION: 98 % | SYSTOLIC BLOOD PRESSURE: 149 MMHG | DIASTOLIC BLOOD PRESSURE: 79 MMHG | BODY MASS INDEX: 32.38 KG/M2 | WEIGHT: 180 LBS

## 2024-01-18 DIAGNOSIS — Z51.5 ENCOUNTER FOR PALLIATIVE CARE: ICD-10-CM

## 2024-01-18 DIAGNOSIS — Z79.891 ENCOUNTER FOR LONG-TERM USE OF OPIATE ANALGESIC: ICD-10-CM

## 2024-01-18 DIAGNOSIS — R68.2 DRY MOUTH: ICD-10-CM

## 2024-01-18 DIAGNOSIS — C67.9 MALIGNANT NEOPLASM OF URINARY BLADDER, UNSPECIFIED SITE (H): Primary | ICD-10-CM

## 2024-01-18 DIAGNOSIS — M54.50 CHRONIC MIDLINE LOW BACK PAIN WITHOUT SCIATICA: ICD-10-CM

## 2024-01-18 DIAGNOSIS — G89.29 CHRONIC MIDLINE LOW BACK PAIN WITHOUT SCIATICA: ICD-10-CM

## 2024-01-18 DIAGNOSIS — M19.90 ARTHRITIS: ICD-10-CM

## 2024-01-18 PROCEDURE — G0463 HOSPITAL OUTPT CLINIC VISIT: HCPCS | Performed by: STUDENT IN AN ORGANIZED HEALTH CARE EDUCATION/TRAINING PROGRAM

## 2024-01-18 PROCEDURE — 99214 OFFICE O/P EST MOD 30 MIN: CPT | Performed by: STUDENT IN AN ORGANIZED HEALTH CARE EDUCATION/TRAINING PROGRAM

## 2024-01-18 ASSESSMENT — PAIN SCALES - GENERAL: PAINLEVEL: EXTREME PAIN (9)

## 2024-01-18 NOTE — PROGRESS NOTES
Palliative Care Progress Note    Patient Name: Christiana Salazar  Primary Provider: Mery Vizcarra    Chief Complaint/Patient ID: Christiana Salazar is a 89 year old female with PMHx of muscle invasive bladder cancer. Dx 11/2020, s/p induction BCG x6 with CR and no maintenance tx offered. Evidence of recurrence 11/2021, s/p TURBT with no evidence of metastatic dz. She ultimately elected to not pursue systemic chemo or radiation therapy. Additional Past medical history is significant for HTN, HLD, GERD, cognitive decline, and arthritis (longstanding hx of chronic LBP with hx of injections).     Last Palliative care appointment: 7/13/23 with me.      Reviewed: Yes    ORT Score: 0    Interim History:  Christiana Salazar is a 89 year old female who is seen today for a follow up visit with Palliative Care. She is here today with her daughter Rachelle who provides additional history.      Reviewed oncology note from 1/4.  Imaging continues to be stable with no evidence of metastatic disease.  Labs are also stable.  Confirms she would not want to proceed with systemic treatment if recurrence was found.    Overall she is doing fairly well.  Daughter does note that they have increased her care little bit due to some ongoing slow changes in her memory.  Patient seems unaware of these changes herself.  Staff at facility are now helping her with getting down meals on time as well as administering all of her medications.    Deals with dry mouth on a regular basis.  Says that hard candies sometimes help.    Back pain is stable.  Still has some arthritis pain as well as some left shoulder pain that will come on.  Has been using topical NSAIDs (Aspercreme or Salonpas), however she has never applied this to the shoulder.    Discussed that they are needing to find a new primary doctor.  Given how stable her scans have been, they do not feel like they are going to need another oncologist at this point in time.  Considering changing to  primary care at her living facility.     Social History:  Moved to [place] from [place].  Had 5 children, however unfortunately lost 2 of her sons suddenly within 6 months of each other in 2021.  Currently living at the [facility].  Was raised [employer] and was very involved in the [employer].   Social History     Tobacco Use    Smoking status: Never    Smokeless tobacco: Never   Substance Use Topics    Alcohol use: Not Currently     Advanced Care Planning: There is a legal power of  document on file naming daughter. No HCD currently available to us. Provided into 3/9/23.     Family History- Reviewed in Epic.    Medications- Reviewed in Epic.    Past Medical History- Reviewed in [facility].    Past Surgical History- Reviewed in Epic.    Physical Exam:   BP (!) 149/79   Pulse 85   Resp 16   Wt 81.6 kg (180 lb)   SpO2 98%   BMI 32.38 kg/m    Constitutional: Alert, pleasant, no apparent distress. Sitting up in chair.  Eyes: Sclera non-icteric, no eye discharge.  ENT: No nasal discharge. Ears grossly normal.  Respiratory: Unlabored respirations. Speaking in full sentences.  Musculoskeletal: Extremities appear normal- no gross deformities noted. No edema noted on upper body.   Skin: No suspicious lesions or rashes on visible skin.  Neurologic: Clear speech, no aphasia. No facial droop.  Psychiatric: Mentation appears a little slowed, mostly appropriate attention. Affect normal/bright. Does not appear anxious or depressed.      Key Data Reviewed:  LABS: 1/2/24- Cr 0.97, GFR 55, Albumin 4.3, LFTs wnl. WBC 7.5, Hgb 12.4, Plts 298.     IMAGING: CT CAP 1/2/24- IMPRESSION: No convincing recurrent or residual malignancy  demonstrated. Stable exam.    Impression & Recommendations & Counseling:  Christiana Salazar is a 89 year old female with history of muscle invasive bladder cancer.     Pain - Chronic back pain stable.  Discussed that with another follow-up scan showing no evidence of cancer, as well as no cancer related  pain, we likely need to look at transitioning her to a different pain management specialist (I only manage cancer related pain).  I did suggest looking into a Butrans patch, however daughter was unsure about this.  - Continue Norco 1 tablet twice daily for now.    - Daughter will talk to the Primary team at the living facility to see if there is anyone in particular they work with to manage chronic pain.  Offered that I could also place a referral internally with Rajesh if needed.  - Will plan to continue with follow-up in 6 months for now, however we will continue to also work on a transition plan for pain management.  - For her shoulder pain, suggested applying a topical NSAID before bed to prevent this pain from getting worse overnight.    Dry Mouth - Really bothersome with her dentures.  -Suggested looking into Biotene lozenges.  Also okay to continue with sugar-free candy.      Follow up: 6 months         Total time spent on day of encounter is 36 mins, including reviewing record, review of above studies, above visit with patient (FTF 11:25 AM-11:49 AM), symptomatic discussion as above, including medication adjustments/prescription management, and documentation.     Yaritza Boggs,   Palliative Medicine   AMCOM ID [id number]    Some chart documentation performed using Dragon Voice recognition Software. Although reviewed after completion, some words and grammatical errors may remain.

## 2024-01-18 NOTE — LETTER
1/18/2024         RE: Christiana Salazar  [id number] [facility] [address]        Dear Colleague,    Thank you for referring your patient, Christiana Salazar, to the [facility]. Please see a copy of my visit note below.    Palliative Care Progress Note    Patient Name: Christiana Salazar  Primary Provider: Mery Vizcarra    Chief Complaint/Patient ID: Christiana Salazar is a 89 year old female with PMHx of muscle invasive bladder cancer. Dx 11/2020, s/p induction BCG x6 with CR and no maintenance tx offered. Evidence of recurrence 11/2021, s/p TURBT with no evidence of metastatic dz. She ultimately elected to not pursue systemic chemo or radiation therapy. Additional Past medical history is significant for HTN, HLD, GERD, cognitive decline, and arthritis (longstanding hx of chronic LBP with hx of injections).     Last Palliative care appointment: 7/13/23 with me.      Reviewed: Yes    ORT Score: 0    Interim History:  Christiana Salazar is a 89 year old female who is seen today for a follow up visit with Palliative Care. She is here today with her daughter Rachelle who provides additional history.      Reviewed oncology note from 1/4.  Imaging continues to be stable with no evidence of metastatic disease.  Labs are also stable.  Confirms she would not want to proceed with systemic treatment if recurrence was found.    Overall she is doing fairly well.  Daughter does note that they have increased her care little bit due to some ongoing slow changes in her memory.  Patient seems unaware of these changes herself.  Staff at facility are now helping her with getting down meals on time as well as administering all of her medications.    Deals with dry mouth on a regular basis.  Says that hard candies sometimes help.    Back pain is stable.  Still has some arthritis pain as well as some left shoulder pain that will come on.  Has been using topical NSAIDs (Aspercreme or Salonpas), however she has  never applied this to the shoulder.    Discussed that they are needing to find a new primary doctor.  Given how stable her scans have been, they do not feel like they are going to need another oncologist at this point in time.  Considering changing to primary care at her living facility.     Social History:  Moved to [place] from [place].  Had 5 children, however unfortunately lost 2 of her sons suddenly within 6 months of each other in 2021.  Currently living at the [facility].  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   Social History     Tobacco Use     Smoking status: Never     Smokeless tobacco: Never   Substance Use Topics     Alcohol use: Not Currently     Advanced Care Planning: There is a legal power of  document on file naming daughter. No HCD currently available to us. Provided into 3/9/23.     Family History- Reviewed in Epic.    Medications- Reviewed in Epic.    Past Medical History- Reviewed in [facility].    Past Surgical History- Reviewed in Epic.    Physical Exam:   BP (!) 149/79   Pulse 85   Resp 16   Wt 81.6 kg (180 lb)   SpO2 98%   BMI 32.38 kg/m    Constitutional: Alert, pleasant, no apparent distress. Sitting up in chair.  Eyes: Sclera non-icteric, no eye discharge.  ENT: No nasal discharge. Ears grossly normal.  Respiratory: Unlabored respirations. Speaking in full sentences.  Musculoskeletal: Extremities appear normal- no gross deformities noted. No edema noted on upper body.   Skin: No suspicious lesions or rashes on visible skin.  Neurologic: Clear speech, no aphasia. No facial droop.  Psychiatric: Mentation appears a little slowed, mostly appropriate attention. Affect normal/bright. Does not appear anxious or depressed.      Key Data Reviewed:  LABS: 1/2/24- Cr 0.97, GFR 55, Albumin 4.3, LFTs wnl. WBC 7.5, Hgb 12.4, Plts 298.     IMAGING: CT CAP 1/2/24- IMPRESSION: No convincing recurrent or residual malignancy  demonstrated. Stable exam.    Impression &  Recommendations & Counseling:  Christiana Salazar is a 89 year old female with history of muscle invasive bladder cancer.     Pain - Chronic back pain stable.  Discussed that with another follow-up scan showing no evidence of cancer, as well as no cancer related pain, we likely need to look at transitioning her to a different pain management specialist (I only manage cancer related pain).  I did suggest looking into a Butrans patch, however daughter was unsure about this.  - Continue Norco 1 tablet twice daily for now.    - Daughter will talk to the Primary team at the living facility to see if there is anyone in particular they work with to manage chronic pain.  Offered that I could also place a referral internally with [facility] if needed.  - Will plan to continue with follow-up in 6 months for now, however we will continue to also work on a transition plan for pain management.  - For her shoulder pain, suggested applying a topical NSAID before bed to prevent this pain from getting worse overnight.    Dry Mouth - Really bothersome with her dentures.  -Suggested looking into Biotene lozenges.  Also okay to continue with sugar-free candy.      Follow up: 6 months         Total time spent on day of encounter is 36 mins, including reviewing record, review of above studies, above visit with patient (FTF 11:25 AM-11:49 AM), symptomatic discussion as above, including medication adjustments/prescription management, and documentation.     Yaritza Boggs, DO  Palliative Medicine   [id number]    Some chart documentation performed using Dragon Voice recognition Software. Although reviewed after completion, some words and grammatical errors may remain.        Oncology Rooming Note    January 18, 2024 11:18 AM   Christiana Salazar is a 89 year old female who presents for:    Chief Complaint   Patient presents with     Oncology Clinic Visit     Initial Vitals: BP (!) 149/79   Pulse 85   Resp 16   Wt 81.6 kg (180 lb)    "SpO2 98%   BMI 32.38 kg/m   Estimated body mass index is 32.38 kg/m  as calculated from the following:    Height as of 1/4/24: 1.588 m (5' 2.52\").    Weight as of this encounter: 81.6 kg (180 lb). Body surface area is 1.9 meters squared.  Extreme Pain (9) Comment: Data Unavailable   No LMP recorded.  Allergies reviewed: Yes  Medications reviewed: Yes    Medications: refill  Pharmacy name entered into [employer]:    [id number] [place] [facility] [address] [address]  [facility] [id number] [place] [address]    Frailty Screening:   Is the patient here for a new oncology consult visit in cancer care? 2. No      Clinical concerns: doctor    was notified.      Laurence Cochran, [facility]                Again, thank you for allowing me to participate in the care of your patient.        Sincerely,        Yaritza Boggs, DO  "

## 2024-01-18 NOTE — PATIENT INSTRUCTIONS
Recommendations:  -Trial of Biotene lozenges for dry mouth.  -Okay to apply Aspercreme or Salonpas to left shoulder.  -Let us know what the medicine team at a [facility] facility says regarding what they typically do with patients who are on opiates for pain management.  I can place a referral to anyone, including outside of [place].    Follow up: 6 months pending with plan for pain management transition      Reasons to Call    If you are having worsening/uncontrolled symptoms we want you to call!    You or your other physicians make any changes to medications we have prescribed.  -Please call for refills 4-5 days before you will run out of medication.    Important Phone Numbers, including: Refills, scheduling, and general questions     Palliative Care RN: Carmelina Davis - [phone]  *After hours or on weekends. Will connect you with on call MD. [phone]

## 2024-02-12 DIAGNOSIS — G89.3 CANCER RELATED PAIN: ICD-10-CM

## 2024-02-12 RX ORDER — HYDROCODONE BITARTRATE AND ACETAMINOPHEN 5; 325 MG/1; MG/1
1 TABLET ORAL EVERY 12 HOURS
Qty: 60 TABLET | Refills: 0 | Status: SHIPPED | OUTPATIENT
Start: 2024-02-12 | End: 2024-03-11

## 2024-02-12 NOTE — TELEPHONE ENCOUNTER
Received electronic request from pharmacy for a new order for Norco.    Last refill: 1/15/24  Last office visit: 1/18/24  Scheduled for follow up pending, msg sent to scheduling.      Will route request to MD for review.     Reviewed [place]  Report.

## 2024-02-16 DIAGNOSIS — G89.3 CANCER RELATED PAIN: ICD-10-CM

## 2024-02-16 RX ORDER — HYDROCODONE BITARTRATE AND ACETAMINOPHEN 5; 325 MG/1; MG/1
TABLET ORAL
Qty: 60 TABLET | Refills: 0 | OUTPATIENT
Start: 2024-02-16

## 2024-03-11 DIAGNOSIS — G89.3 CANCER RELATED PAIN: ICD-10-CM

## 2024-03-11 RX ORDER — HYDROCODONE BITARTRATE AND ACETAMINOPHEN 5; 325 MG/1; MG/1
1 TABLET ORAL EVERY 12 HOURS
Qty: 60 TABLET | Refills: 0 | Status: SHIPPED | OUTPATIENT
Start: 2024-03-13 | End: 2024-04-19

## 2024-03-11 NOTE — TELEPHONE ENCOUNTER
Received MyChart message from pharmacy requesting refill of Norco.     Last refill: 2/14/2024  Last office visit: 1/18/2024  Scheduled for follow up 7/18/2024     Will route request to MD for review.     Reviewed [place]  Report.

## 2024-04-19 DIAGNOSIS — G89.3 CANCER RELATED PAIN: ICD-10-CM

## 2024-04-19 RX ORDER — HYDROCODONE BITARTRATE AND ACETAMINOPHEN 5; 325 MG/1; MG/1
1 TABLET ORAL EVERY 12 HOURS
Qty: 60 TABLET | Refills: 0 | Status: SHIPPED | OUTPATIENT
Start: 2024-04-19 | End: 2024-05-14

## 2024-04-19 NOTE — TELEPHONE ENCOUNTER
Received electronic message from pharmacy requesting a new order for Norco.    Last refill: 3/15/24  Last office visit: 1/18/24  Scheduled for follow up 7/18/24     Will route request to MD for review.     Reviewed [place]  Report.

## 2024-05-14 DIAGNOSIS — G89.3 CANCER RELATED PAIN: ICD-10-CM

## 2024-05-14 RX ORDER — HYDROCODONE BITARTRATE AND ACETAMINOPHEN 5; 325 MG/1; MG/1
1 TABLET ORAL EVERY 12 HOURS
Qty: 60 TABLET | Refills: 0 | Status: SHIPPED | OUTPATIENT
Start: 2024-05-17

## 2024-05-14 NOTE — TELEPHONE ENCOUNTER
Received electronic message from pharmacy requesting a new order for Norco.    Last refill: 4/19/24  Last office visit: 1/18/24  Scheduled for follow up 7/18/24     Will route request to MD for review.     Reviewed [place]  Report.